# Patient Record
Sex: MALE | Race: WHITE | NOT HISPANIC OR LATINO | Employment: OTHER | URBAN - METROPOLITAN AREA
[De-identification: names, ages, dates, MRNs, and addresses within clinical notes are randomized per-mention and may not be internally consistent; named-entity substitution may affect disease eponyms.]

---

## 2023-04-10 ENCOUNTER — OFFICE VISIT (OUTPATIENT)
Dept: PRIMARY CARE | Facility: CLINIC | Age: 73
End: 2023-04-10
Payer: MEDICARE

## 2023-04-10 VITALS
DIASTOLIC BLOOD PRESSURE: 80 MMHG | WEIGHT: 210 LBS | SYSTOLIC BLOOD PRESSURE: 144 MMHG | BODY MASS INDEX: 29.29 KG/M2 | HEART RATE: 70 BPM

## 2023-04-10 DIAGNOSIS — M25.511 CHRONIC RIGHT SHOULDER PAIN: Primary | ICD-10-CM

## 2023-04-10 DIAGNOSIS — G89.29 CHRONIC RIGHT SHOULDER PAIN: Primary | ICD-10-CM

## 2023-04-10 PROBLEM — M51.06 LUMBAR DISC HERNIATION WITH MYELOPATHY: Status: ACTIVE | Noted: 2023-04-10

## 2023-04-10 PROBLEM — M19.041 LOCALIZED OSTEOARTHRITIS OF RIGHT HAND: Status: ACTIVE | Noted: 2023-04-10

## 2023-04-10 PROBLEM — E79.0 HYPERURICEMIA: Status: ACTIVE | Noted: 2023-04-10

## 2023-04-10 PROBLEM — N13.8 BPH WITH OBSTRUCTION/LOWER URINARY TRACT SYMPTOMS: Status: ACTIVE | Noted: 2023-04-10

## 2023-04-10 PROBLEM — S63.289A DISLOCATION OF FINGER PIP JOINT: Status: ACTIVE | Noted: 2023-04-10

## 2023-04-10 PROBLEM — M47.22 OSTEOARTHRITIS OF SPINE WITH RADICULOPATHY, CERVICAL REGION: Status: ACTIVE | Noted: 2023-04-10

## 2023-04-10 PROBLEM — M19.042 LOCALIZED OSTEOARTHRITIS OF LEFT HAND: Status: ACTIVE | Noted: 2023-04-10

## 2023-04-10 PROBLEM — M25.551 BILATERAL HIP PAIN: Status: ACTIVE | Noted: 2023-04-10

## 2023-04-10 PROBLEM — I25.10 CORONARY ARTERY CALCIFICATION SEEN ON CT SCAN: Status: ACTIVE | Noted: 2023-04-10

## 2023-04-10 PROBLEM — H81.10 BPPV (BENIGN PAROXYSMAL POSITIONAL VERTIGO): Status: ACTIVE | Noted: 2023-04-10

## 2023-04-10 PROBLEM — J43.9 EMPHYSEMA LUNG (MULTI): Status: ACTIVE | Noted: 2023-04-10

## 2023-04-10 PROBLEM — B35.1 ONYCHOMYCOSIS: Status: ACTIVE | Noted: 2023-04-10

## 2023-04-10 PROBLEM — I10 BENIGN ESSENTIAL HYPERTENSION: Status: ACTIVE | Noted: 2023-04-10

## 2023-04-10 PROBLEM — G25.0 ESSENTIAL TREMOR: Status: ACTIVE | Noted: 2023-04-10

## 2023-04-10 PROBLEM — R97.20 INCREASED PROSTATE SPECIFIC ANTIGEN (PSA) VELOCITY: Status: ACTIVE | Noted: 2023-04-10

## 2023-04-10 PROBLEM — Z81.2 FAMILY HISTORY OF TOBACCO ABUSE AND DEPENDENCE: Status: ACTIVE | Noted: 2023-04-10

## 2023-04-10 PROBLEM — M79.644 PAIN OF FINGER OF RIGHT HAND: Status: ACTIVE | Noted: 2023-04-10

## 2023-04-10 PROBLEM — K29.60 NSAID INDUCED GASTRITIS: Status: ACTIVE | Noted: 2023-04-10

## 2023-04-10 PROBLEM — Z78.9 SOCIAL DRINKER: Status: ACTIVE | Noted: 2023-04-10

## 2023-04-10 PROBLEM — D12.6 TUBULAR ADENOMA OF COLON: Status: ACTIVE | Noted: 2023-04-10

## 2023-04-10 PROBLEM — M62.830 LUMBAR PARASPINAL MUSCLE SPASM: Status: ACTIVE | Noted: 2023-04-10

## 2023-04-10 PROBLEM — M47.816 LOCALIZED OSTEOARTHRITIS OF LUMBAR SPINE: Status: ACTIVE | Noted: 2023-04-10

## 2023-04-10 PROBLEM — H61.899 DEBRIS IN EAR CANAL: Status: ACTIVE | Noted: 2023-04-10

## 2023-04-10 PROBLEM — M25.441: Status: ACTIVE | Noted: 2023-04-10

## 2023-04-10 PROBLEM — N40.1 BPH WITH OBSTRUCTION/LOWER URINARY TRACT SYMPTOMS: Status: ACTIVE | Noted: 2023-04-10

## 2023-04-10 PROBLEM — M25.552 BILATERAL HIP PAIN: Status: ACTIVE | Noted: 2023-04-10

## 2023-04-10 PROBLEM — E78.5 HYPERLIPIDEMIA: Status: ACTIVE | Noted: 2023-04-10

## 2023-04-10 PROBLEM — T39.395A NSAID INDUCED GASTRITIS: Status: ACTIVE | Noted: 2023-04-10

## 2023-04-10 PROBLEM — S63.259A CLOSED DISLOCATION OF FINGER: Status: ACTIVE | Noted: 2023-04-10

## 2023-04-10 PROBLEM — K92.2 GASTROINTESTINAL HEMORRHAGE: Status: ACTIVE | Noted: 2018-08-11

## 2023-04-10 PROBLEM — R91.8 PULMONARY NODULES: Status: ACTIVE | Noted: 2023-04-10

## 2023-04-10 PROCEDURE — 99213 OFFICE O/P EST LOW 20 MIN: CPT | Performed by: FAMILY MEDICINE

## 2023-04-10 PROCEDURE — 1160F RVW MEDS BY RX/DR IN RCRD: CPT | Performed by: FAMILY MEDICINE

## 2023-04-10 PROCEDURE — 20610 DRAIN/INJ JOINT/BURSA W/O US: CPT | Performed by: FAMILY MEDICINE

## 2023-04-10 PROCEDURE — 1159F MED LIST DOCD IN RCRD: CPT | Performed by: FAMILY MEDICINE

## 2023-04-10 PROCEDURE — 3077F SYST BP >= 140 MM HG: CPT | Performed by: FAMILY MEDICINE

## 2023-04-10 PROCEDURE — 3079F DIAST BP 80-89 MM HG: CPT | Performed by: FAMILY MEDICINE

## 2023-04-10 RX ORDER — MELOXICAM 7.5 MG/1
7.5 TABLET ORAL 2 TIMES DAILY
COMMUNITY
End: 2023-11-28 | Stop reason: WASHOUT

## 2023-04-10 RX ORDER — OLMESARTAN MEDOXOMIL 40 MG/1
40 TABLET ORAL DAILY
COMMUNITY
End: 2023-06-15 | Stop reason: SDUPTHER

## 2023-04-10 RX ORDER — ATORVASTATIN CALCIUM 40 MG/1
40 TABLET, FILM COATED ORAL DAILY
COMMUNITY
End: 2023-06-15 | Stop reason: SDUPTHER

## 2023-04-10 RX ORDER — TRIAMCINOLONE ACETONIDE 40 MG/ML
40 INJECTION, SUSPENSION INTRA-ARTICULAR; INTRAMUSCULAR
Status: COMPLETED | OUTPATIENT
Start: 2023-04-10 | End: 2023-04-10

## 2023-04-10 RX ORDER — GABAPENTIN 300 MG/1
300 CAPSULE ORAL NIGHTLY
COMMUNITY
Start: 2022-04-13 | End: 2023-11-28 | Stop reason: WASHOUT

## 2023-04-10 RX ORDER — TADALAFIL 2.5 MG/1
2.5 TABLET ORAL DAILY
COMMUNITY
End: 2023-06-15 | Stop reason: SDUPTHER

## 2023-04-10 RX ORDER — OMEPRAZOLE 20 MG/1
1 CAPSULE, DELAYED RELEASE ORAL
COMMUNITY
Start: 2022-02-09 | End: 2023-11-28 | Stop reason: WASHOUT

## 2023-04-10 RX ORDER — ALLOPURINOL 100 MG/1
100 TABLET ORAL DAILY
COMMUNITY
End: 2023-06-15 | Stop reason: SDUPTHER

## 2023-04-10 RX ADMIN — TRIAMCINOLONE ACETONIDE 40 MG: 40 INJECTION, SUSPENSION INTRA-ARTICULAR; INTRAMUSCULAR at 14:15

## 2023-04-10 ASSESSMENT — ENCOUNTER SYMPTOMS
DEPRESSION: 0
LOSS OF SENSATION IN FEET: 0
OCCASIONAL FEELINGS OF UNSTEADINESS: 0

## 2023-04-10 ASSESSMENT — PATIENT HEALTH QUESTIONNAIRE - PHQ9
1. LITTLE INTEREST OR PLEASURE IN DOING THINGS: NOT AT ALL
SUM OF ALL RESPONSES TO PHQ9 QUESTIONS 1 AND 2: 0
2. FEELING DOWN, DEPRESSED OR HOPELESS: NOT AT ALL

## 2023-04-10 NOTE — ASSESSMENT & PLAN NOTE
Right shoulder cortisone injection provided in office today.  We may continue to repeat these every 3+ months as necessary

## 2023-04-10 NOTE — PROGRESS NOTES
Subjective   Leonides Izaguirre is a 72 y.o. male who presents for cortisone injection (Right shoulder)    HPI  Presents today requesting repeat right shoulder cortisone injection  Has not had cortisone injection since March 2022, and it did not start bothering him until recently.  Pain started after lifting lots of heavy objects into the trunk of his car to move back up from [place].  Denies trauma.  Denies numbness or tingling to the fingers.    ROS: All pertinent positive symptoms are included in the history of present illness.    All other systems have been reviewed and are negative and noncontributory to this patient's current ailments.    Patient ID: Leonides Izaguirre is a 72 y.o. male.    Joint Injection Large/Arthrocentesis: R subacromial bursa on 4/10/2023 2:15 PM  Indications: pain  Details: 22 G needle, posterior approach  Medications: 40 mg triamcinolone acetonide 40 mg/mL (2 mL 1% lidocaine)  Outcome: tolerated well, no immediate complications  Procedure, treatment alternatives, risks and benefits explained, specific risks discussed. Consent was given by the patient.       Objective     Vitals:    04/10/23 1359   BP: 144/80   Pulse: 70   Weight: 95.3 kg (210 lb)       Physical Exam    CONSTITUTIONAL - well nourished, well developed, looks like stated age, in no acute distress, not ill-appearing, and not tired appearing  SKIN - No lesions or rashes visualized.   HEAD - Atraumatic, normocephalic.  EYES - EOMI with normal external exam  RESP - respiration not labored   CARDIAC - extremities warm, well-perfused  PSYCHIATRIC - alert, oriented to time, place, person and no difficulty with speech or language  MUSCULOSKELETAL -right shoulder flexion and abduction limited secondary to pain.  Mild tenderness to palpation over the supraspinatus    Assessment/Plan   Problem List Items Addressed This Visit          Musculoskeletal    Right shoulder pain - Primary     Right shoulder cortisone injection provided in office  today.  We may continue to repeat these every 3+ months as necessary

## 2023-06-06 ENCOUNTER — LAB (OUTPATIENT)
Dept: LAB | Facility: LAB | Age: 73
End: 2023-06-06
Payer: MEDICARE

## 2023-06-06 DIAGNOSIS — E78.2 MIXED HYPERLIPIDEMIA: ICD-10-CM

## 2023-06-06 DIAGNOSIS — I10 BENIGN ESSENTIAL HYPERTENSION: ICD-10-CM

## 2023-06-06 DIAGNOSIS — E79.0 HYPERURICEMIA: Primary | ICD-10-CM

## 2023-06-06 DIAGNOSIS — E79.0 HYPERURICEMIA: ICD-10-CM

## 2023-06-06 LAB
ALANINE AMINOTRANSFERASE (SGPT) (U/L) IN SER/PLAS: 19 U/L (ref 10–52)
ALBUMIN (G/DL) IN SER/PLAS: 4.4 G/DL (ref 3.4–5)
ALKALINE PHOSPHATASE (U/L) IN SER/PLAS: 79 U/L (ref 33–136)
ANION GAP IN SER/PLAS: 12 MMOL/L (ref 10–20)
ASPARTATE AMINOTRANSFERASE (SGOT) (U/L) IN SER/PLAS: 18 U/L (ref 9–39)
BILIRUBIN TOTAL (MG/DL) IN SER/PLAS: 0.7 MG/DL (ref 0–1.2)
CALCIUM (MG/DL) IN SER/PLAS: 9.9 MG/DL (ref 8.6–10.3)
CARBON DIOXIDE, TOTAL (MMOL/L) IN SER/PLAS: 30 MMOL/L (ref 21–32)
CHLORIDE (MMOL/L) IN SER/PLAS: 104 MMOL/L (ref 98–107)
CHOLESTEROL (MG/DL) IN SER/PLAS: 147 MG/DL (ref 0–199)
CHOLESTEROL IN HDL (MG/DL) IN SER/PLAS: 55.4 MG/DL
CHOLESTEROL/HDL RATIO: 2.7
CREATININE (MG/DL) IN SER/PLAS: 1.15 MG/DL (ref 0.5–1.3)
GFR MALE: 67 ML/MIN/1.73M2
GLUCOSE (MG/DL) IN SER/PLAS: 95 MG/DL (ref 74–99)
LDL: 60 MG/DL (ref 0–99)
POTASSIUM (MMOL/L) IN SER/PLAS: 5.2 MMOL/L (ref 3.5–5.3)
PROTEIN TOTAL: 7 G/DL (ref 6.4–8.2)
SODIUM (MMOL/L) IN SER/PLAS: 141 MMOL/L (ref 136–145)
TRIGLYCERIDE (MG/DL) IN SER/PLAS: 156 MG/DL (ref 0–149)
URATE (MG/DL) IN SER/PLAS: 6.8 MG/DL (ref 4–7.5)
UREA NITROGEN (MG/DL) IN SER/PLAS: 14 MG/DL (ref 6–23)
VLDL: 31 MG/DL (ref 0–40)

## 2023-06-06 PROCEDURE — 84550 ASSAY OF BLOOD/URIC ACID: CPT

## 2023-06-06 PROCEDURE — 80061 LIPID PANEL: CPT

## 2023-06-06 PROCEDURE — 80053 COMPREHEN METABOLIC PANEL: CPT

## 2023-06-06 PROCEDURE — 36415 COLL VENOUS BLD VENIPUNCTURE: CPT

## 2023-06-07 NOTE — RESULT ENCOUNTER NOTE
We will review in the office on Kalie 15    Cholesterol 147, 55, 60, 156 which is a very nice panel    Sugar, kidney, liver, electrolytes normal    Uric acid 6.8, so we could do better because the goal is less than 6.0.  If you are not opposed to it, I would recommend increasing the allopurinol to 300 mg daily to get that even lower.  Uric acid crystals starts to resolve when we get that number below about 4.0

## 2023-06-15 ENCOUNTER — OFFICE VISIT (OUTPATIENT)
Dept: PRIMARY CARE | Facility: CLINIC | Age: 73
End: 2023-06-15
Payer: MEDICARE

## 2023-06-15 ENCOUNTER — LAB (OUTPATIENT)
Dept: LAB | Facility: LAB | Age: 73
End: 2023-06-15
Payer: MEDICARE

## 2023-06-15 VITALS
SYSTOLIC BLOOD PRESSURE: 140 MMHG | DIASTOLIC BLOOD PRESSURE: 80 MMHG | BODY MASS INDEX: 29.29 KG/M2 | HEART RATE: 70 BPM | WEIGHT: 210 LBS

## 2023-06-15 DIAGNOSIS — H61.899 DEBRIS IN EAR CANAL: ICD-10-CM

## 2023-06-15 DIAGNOSIS — I10 BENIGN ESSENTIAL HYPERTENSION: ICD-10-CM

## 2023-06-15 DIAGNOSIS — N40.1 BPH WITH OBSTRUCTION/LOWER URINARY TRACT SYMPTOMS: ICD-10-CM

## 2023-06-15 DIAGNOSIS — J43.1 PANLOBULAR EMPHYSEMA (MULTI): ICD-10-CM

## 2023-06-15 DIAGNOSIS — E79.0 HYPERURICEMIA: ICD-10-CM

## 2023-06-15 DIAGNOSIS — N13.8 BPH WITH OBSTRUCTION/LOWER URINARY TRACT SYMPTOMS: ICD-10-CM

## 2023-06-15 DIAGNOSIS — Z00.00 ENCOUNTER FOR ANNUAL WELLNESS EXAM IN MEDICARE PATIENT: Primary | ICD-10-CM

## 2023-06-15 DIAGNOSIS — R97.20 ELEVATED PSA: ICD-10-CM

## 2023-06-15 DIAGNOSIS — E78.2 MIXED HYPERLIPIDEMIA: ICD-10-CM

## 2023-06-15 PROBLEM — M25.511 RIGHT SHOULDER PAIN: Status: RESOLVED | Noted: 2023-04-10 | Resolved: 2023-06-15

## 2023-06-15 PROBLEM — Z81.2 FAMILY HISTORY OF TOBACCO ABUSE AND DEPENDENCE: Status: RESOLVED | Noted: 2023-04-10 | Resolved: 2023-06-15

## 2023-06-15 PROBLEM — H81.10 BPPV (BENIGN PAROXYSMAL POSITIONAL VERTIGO): Status: RESOLVED | Noted: 2023-04-10 | Resolved: 2023-06-15

## 2023-06-15 PROBLEM — K29.60 NSAID INDUCED GASTRITIS: Status: RESOLVED | Noted: 2023-04-10 | Resolved: 2023-06-15

## 2023-06-15 PROBLEM — T39.395A NSAID INDUCED GASTRITIS: Status: RESOLVED | Noted: 2023-04-10 | Resolved: 2023-06-15

## 2023-06-15 PROBLEM — M79.644 PAIN OF FINGER OF RIGHT HAND: Status: RESOLVED | Noted: 2023-04-10 | Resolved: 2023-06-15

## 2023-06-15 PROBLEM — M25.441: Status: RESOLVED | Noted: 2023-04-10 | Resolved: 2023-06-15

## 2023-06-15 PROBLEM — S63.289A DISLOCATION OF FINGER PIP JOINT: Status: RESOLVED | Noted: 2023-04-10 | Resolved: 2023-06-15

## 2023-06-15 PROBLEM — M62.830 LUMBAR PARASPINAL MUSCLE SPASM: Status: RESOLVED | Noted: 2023-04-10 | Resolved: 2023-06-15

## 2023-06-15 PROBLEM — S63.259A CLOSED DISLOCATION OF FINGER: Status: RESOLVED | Noted: 2023-04-10 | Resolved: 2023-06-15

## 2023-06-15 PROBLEM — K92.2 GASTROINTESTINAL HEMORRHAGE: Status: RESOLVED | Noted: 2018-08-11 | Resolved: 2023-06-15

## 2023-06-15 LAB — PROSTATE SPECIFIC AG (NG/ML) IN SER/PLAS: 1.69 NG/ML (ref 0–4)

## 2023-06-15 PROCEDURE — 36415 COLL VENOUS BLD VENIPUNCTURE: CPT

## 2023-06-15 PROCEDURE — 84153 ASSAY OF PSA TOTAL: CPT

## 2023-06-15 PROCEDURE — 1160F RVW MEDS BY RX/DR IN RCRD: CPT | Performed by: FAMILY MEDICINE

## 2023-06-15 PROCEDURE — 1170F FXNL STATUS ASSESSED: CPT | Performed by: FAMILY MEDICINE

## 2023-06-15 PROCEDURE — 99214 OFFICE O/P EST MOD 30 MIN: CPT | Performed by: FAMILY MEDICINE

## 2023-06-15 PROCEDURE — 3077F SYST BP >= 140 MM HG: CPT | Performed by: FAMILY MEDICINE

## 2023-06-15 PROCEDURE — 1159F MED LIST DOCD IN RCRD: CPT | Performed by: FAMILY MEDICINE

## 2023-06-15 PROCEDURE — G0439 PPPS, SUBSEQ VISIT: HCPCS | Performed by: FAMILY MEDICINE

## 2023-06-15 PROCEDURE — 3079F DIAST BP 80-89 MM HG: CPT | Performed by: FAMILY MEDICINE

## 2023-06-15 RX ORDER — TADALAFIL 2.5 MG/1
2.5 TABLET ORAL DAILY
Qty: 90 TABLET | Refills: 1 | Status: SHIPPED | OUTPATIENT
Start: 2023-06-15 | End: 2023-11-28 | Stop reason: SDUPTHER

## 2023-06-15 RX ORDER — ALLOPURINOL 100 MG/1
100 TABLET ORAL DAILY
Qty: 90 TABLET | Refills: 1 | Status: SHIPPED | OUTPATIENT
Start: 2023-06-15 | End: 2023-11-28 | Stop reason: SDUPTHER

## 2023-06-15 RX ORDER — ATORVASTATIN CALCIUM 40 MG/1
40 TABLET, FILM COATED ORAL DAILY
Qty: 90 TABLET | Refills: 1 | Status: SHIPPED | OUTPATIENT
Start: 2023-06-15 | End: 2023-11-28 | Stop reason: SDUPTHER

## 2023-06-15 RX ORDER — FLUOCINOLONE ACETONIDE 0.11 MG/ML
5 OIL AURICULAR (OTIC) 2 TIMES DAILY
Qty: 20 ML | Refills: 0 | Status: SHIPPED | OUTPATIENT
Start: 2023-06-15

## 2023-06-15 RX ORDER — OLMESARTAN MEDOXOMIL 40 MG/1
40 TABLET ORAL DAILY
Qty: 90 TABLET | Refills: 1 | Status: SHIPPED | OUTPATIENT
Start: 2023-06-15 | End: 2023-11-28 | Stop reason: SDUPTHER

## 2023-06-15 ASSESSMENT — ENCOUNTER SYMPTOMS
DEPRESSION: 0
LOSS OF SENSATION IN FEET: 0
OCCASIONAL FEELINGS OF UNSTEADINESS: 0

## 2023-06-15 ASSESSMENT — ACTIVITIES OF DAILY LIVING (ADL)
DRESSING: INDEPENDENT
BATHING: INDEPENDENT
TAKING_MEDICATION: INDEPENDENT
MANAGING_FINANCES: INDEPENDENT
DOING_HOUSEWORK: INDEPENDENT
GROCERY_SHOPPING: INDEPENDENT

## 2023-06-15 ASSESSMENT — PATIENT HEALTH QUESTIONNAIRE - PHQ9
SUM OF ALL RESPONSES TO PHQ9 QUESTIONS 1 AND 2: 0
2. FEELING DOWN, DEPRESSED OR HOPELESS: NOT AT ALL
1. LITTLE INTEREST OR PLEASURE IN DOING THINGS: NOT AT ALL

## 2023-06-15 NOTE — PROGRESS NOTES
Subjective   Reason for Visit: Leonides Izaguirre is an 72 y.o. male here for a Medicare Annual Wellness Visit Subsequent, Hypertension, Hyperlipidemia, Hyperuricemia, and Benign Prostatic Hypertrophy.     Past Medical, Surgical, and Family History reviewed and updated in chart.    Reviewed all medications by prescribing practitioner or clinical pharmacist (such as prescriptions, OTCs, herbal therapies and supplements) and documented in the medical record.    HPI  1.  Medicare wellness  Immunizations: Up to date  Colon Cancer Screening: Last performed in Sept. 2022, Multiple polyps, clearance for 2 years.  Prostate Cancer Screening: PSA increased from June 2022 was 1.67 to 2.20 in November 2022. Needs to be rechecked.  Tobacco: Continues to use tobacco daily, desires to discuss AccuLaser.  Last CT performed in Nov. 2022.  EtOH: Occasional use.    2.  Hypertension  Blood pressure today on intake was 140/80, with recheck of 128/80.  Currently taking olmesartan 40 mg daily.  Tolerating the medication well.  Denies chest pain, shortness of breath, headache, dizziness, or leg edema.     3.  Hyperlipidemia  Currently taking atorvastatin 40 mg daily.  Lipid panel performed in June 2023 showed his cholesterol is 147, 55.4, 60, 156.  Triglycerides improved from 243 back in Nov. 2022.  Denies myalgias.     4.  Hyperuricemia  Using allopurinol 100 mg daily.  Last uric acid performed in June 2023, showing 6.8  Has not had any gout flares.  Does not desire to change medications dose.     5.  BPH with LUTS  Currently taking tadalafil 2.5 mg daily.  Occasionally he will forget to take this medication.  Symptoms have been well controlled when he does take his medication.  Desires refills.     6.  Dilatation of ascending aorta  Low-dose CT scan performed of the lung in November 2022 indicating that his ascending aorta is 4.1 cm and is stable with prior exams. Denies shortness of breath or chest pain.   Due for repeated low dose CT in  Nov. 2023.    7.  Debris in ear canals.  Occasionally needs fluocinolone to help.  Desires to have refills.    Review of Systems  All pertinent positive symptoms are included in the history of present illness.    All other systems have been reviewed and are negative and noncontributory to this patient's current ailments.    Current Outpatient Medications   Medication Instructions    allopurinol (ZYLOPRIM) 100 mg, oral, Daily    atorvastatin (LIPITOR) 40 mg, oral, Daily    fluocinolone (DermOtic) 0.01 % ear drops 5 drops, Each Ear, 2 times daily    gabapentin (NEURONTIN) 300 mg, oral, Nightly    meloxicam (MOBIC) 7.5 mg, oral, 2 times daily    olmesartan (BENICAR) 40 mg, oral, Daily    omeprazole (PriLOSEC) 20 mg DR capsule 1 capsule, oral, 2 times daily before meals    tadalafil (CIALIS) 2.5 mg, oral, Daily     Allergies   Allergen Reactions    Amlodipine Swelling     Immunization History   Administered Date(s) Administered    Influenza, High-dose Seasonal, Quadrivalent, Preservative Free 09/18/2020    Influenza, Seasonal, Quadrivalent, Adjuvanted 10/19/2021    Influenza, injectable, quadrivalent 11/27/2019    Moderna SARS-CoV-2 Vaccination 02/04/2021, 03/04/2021    Pfizer Gray Cap SARS-CoV-2 05/20/2022    Pfizer Purple Cap SARS-CoV-2 11/16/2021, 10/12/2022    Pfizer Sars-cov-2 Bivalent 30 mcg/0.3 mL 11/15/2022    Pneumococcal Conjugate PCV 13 11/29/2017    Pneumococcal Polysaccharide PPSV23 05/21/2013, 02/12/2019    Tdap 11/29/2017    Zoster, Recombinant 07/28/2019    Zoster, Unspecified 10/23/2019    Zoster, live 05/21/2013     Past Surgical History:   Procedure Laterality Date    OTHER SURGICAL HISTORY  12/12/2013    Prior Surgical Procedure Not Done     Family History   Problem Relation Name Age of Onset    Esophageal cancer Mother      Colon cancer Mother      Esophageal cancer Maternal Grandmother         Social History     Tobacco Use    Smoking status: Every Day     Types: Cigarettes    Smokeless tobacco:  Never     Patient Self Assessment of Health Status  Patient Self Assessment: Good    Nutrition and Exercise  Current Diet: Well Balanced Diet  Adequate Fluid Intake: Yes  Caffeine: Yes  Exercise Frequency: Infrequently    Functional Ability/Level of Safety  Cognitive Impairment Observed: No cognitive impairment observed  Cognitive Impairment Reported: No cognitive impairment reported by patient or family    Home Safety Risk Factors: None    Objective   Visit Vitals  /80   Pulse 70   Wt 95.3 kg (210 lb)   BMI 29.29 kg/m²   Smoking Status Every Day   BSA 2.18 m²      Physical Exam  CONSTITUTIONAL - INAD. Not ill appearing.  SKIN - No lesions or rashes visualized. Good skin turgor.  HEAD - Atraumatic, normocephalic..  RESP - CTAB. No wheezing, rhonchi, or crackles.   CARDIAC - RRR. No murmurs, gallops, or rubs.    Assessment/Plan   Problem List Items Addressed This Visit       Benign essential hypertension    Current Assessment & Plan     Stable, no changes to medication recommended         Relevant Medications    olmesartan (BENIcar) 40 mg tablet    BPH with obstruction/lower urinary tract symptoms    Current Assessment & Plan     Stable, no changes to medication recommended         Relevant Medications    tadalafil (Cialis) 2.5 mg tablet    Emphysema lung (CMS/HCC)    Current Assessment & Plan     You have been seen by pulmonology in the past, please follow-up if symptoms worsen         Hyperlipidemia    Current Assessment & Plan     Lab work looks stable.  Continue atorvastatin 40 mg daily.         Relevant Medications    atorvastatin (Lipitor) 40 mg tablet    Hyperuricemia    Current Assessment & Plan     Uric acid is 6.8  Continue allopurinol 100 mg daily         Relevant Medications    allopurinol (Zyloprim) 100 mg tablet    Debris in ear canal    Current Assessment & Plan     Refills provided for fluocinolone.         Relevant Medications    fluocinolone (DermOtic) 0.01 % ear drops    Elevated PSA     Current Assessment & Plan     We will notify you of the results and make recommendations accordingly.   If this continues elevated, we will need to consider a urology referral.         Relevant Orders    PSA     Other Visit Diagnoses       Encounter for annual wellness exam in Medicare patient    -  Primary    Questions were reviewed  Immunizations are up-to-date          Patient Care Team:  Michael Huddleston DO as PCP - General  Michael Huddleston DO as PCP - [facility] Attributed Provider

## 2023-06-15 NOTE — ASSESSMENT & PLAN NOTE
We will notify you of the results and make recommendations accordingly.   If this continues elevated, we will need to consider a urology referral.

## 2023-11-21 ENCOUNTER — LAB (OUTPATIENT)
Dept: LAB | Facility: LAB | Age: 73
End: 2023-11-21
Payer: MEDICARE

## 2023-11-21 DIAGNOSIS — I10 BENIGN ESSENTIAL HYPERTENSION: Primary | ICD-10-CM

## 2023-11-21 DIAGNOSIS — I10 BENIGN ESSENTIAL HYPERTENSION: ICD-10-CM

## 2023-11-21 DIAGNOSIS — R97.20 ELEVATED PSA: ICD-10-CM

## 2023-11-21 DIAGNOSIS — E78.2 MIXED HYPERLIPIDEMIA: ICD-10-CM

## 2023-11-21 LAB
ALBUMIN SERPL BCP-MCNC: 4.5 G/DL (ref 3.4–5)
ALP SERPL-CCNC: 75 U/L (ref 33–136)
ALT SERPL W P-5'-P-CCNC: 24 U/L (ref 10–52)
ANION GAP SERPL CALC-SCNC: 11 MMOL/L (ref 10–20)
AST SERPL W P-5'-P-CCNC: 21 U/L (ref 9–39)
BILIRUB SERPL-MCNC: 0.7 MG/DL (ref 0–1.2)
BUN SERPL-MCNC: 17 MG/DL (ref 6–23)
CALCIUM SERPL-MCNC: 9.5 MG/DL (ref 8.6–10.3)
CHLORIDE SERPL-SCNC: 104 MMOL/L (ref 98–107)
CHOLEST SERPL-MCNC: 142 MG/DL (ref 0–199)
CHOLESTEROL/HDL RATIO: 3
CO2 SERPL-SCNC: 29 MMOL/L (ref 21–32)
CREAT SERPL-MCNC: 1.13 MG/DL (ref 0.5–1.3)
GFR SERPL CREATININE-BSD FRML MDRD: 68 ML/MIN/1.73M*2
GLUCOSE SERPL-MCNC: 95 MG/DL (ref 74–99)
HDLC SERPL-MCNC: 46.7 MG/DL
LDLC SERPL CALC-MCNC: 54 MG/DL
NON HDL CHOLESTEROL: 95 MG/DL (ref 0–149)
POTASSIUM SERPL-SCNC: 4.6 MMOL/L (ref 3.5–5.3)
PROT SERPL-MCNC: 7.1 G/DL (ref 6.4–8.2)
PSA SERPL-MCNC: 1.6 NG/ML
SODIUM SERPL-SCNC: 139 MMOL/L (ref 136–145)
TRIGL SERPL-MCNC: 205 MG/DL (ref 0–149)
VLDL: 41 MG/DL (ref 0–40)

## 2023-11-21 PROCEDURE — 80061 LIPID PANEL: CPT

## 2023-11-21 PROCEDURE — 84153 ASSAY OF PSA TOTAL: CPT

## 2023-11-21 PROCEDURE — 80053 COMPREHEN METABOLIC PANEL: CPT

## 2023-11-21 PROCEDURE — 36415 COLL VENOUS BLD VENIPUNCTURE: CPT

## 2023-11-21 NOTE — RESULT ENCOUNTER NOTE
We will review in the office on November 28 together:  Cholesterol 142, 46, 54, 205 previously 147, 55, 60, 156  Prostate cancer screening test is stable with a PSA of 1.60 previous 1.69  Sugar, kidney, liver, electrolytes normal

## 2023-11-28 ENCOUNTER — OFFICE VISIT (OUTPATIENT)
Dept: PRIMARY CARE | Facility: CLINIC | Age: 73
End: 2023-11-28
Payer: MEDICARE

## 2023-11-28 VITALS
WEIGHT: 214 LBS | BODY MASS INDEX: 29.85 KG/M2 | HEART RATE: 72 BPM | SYSTOLIC BLOOD PRESSURE: 132 MMHG | DIASTOLIC BLOOD PRESSURE: 70 MMHG

## 2023-11-28 DIAGNOSIS — E79.0 HYPERURICEMIA: ICD-10-CM

## 2023-11-28 DIAGNOSIS — I10 BENIGN ESSENTIAL HYPERTENSION: Primary | ICD-10-CM

## 2023-11-28 DIAGNOSIS — E78.2 MIXED HYPERLIPIDEMIA: ICD-10-CM

## 2023-11-28 DIAGNOSIS — N40.1 BPH WITH OBSTRUCTION/LOWER URINARY TRACT SYMPTOMS: ICD-10-CM

## 2023-11-28 DIAGNOSIS — N13.8 BPH WITH OBSTRUCTION/LOWER URINARY TRACT SYMPTOMS: ICD-10-CM

## 2023-11-28 PROBLEM — R97.20 ELEVATED PSA: Status: RESOLVED | Noted: 2023-06-15 | Resolved: 2023-11-28

## 2023-11-28 PROBLEM — R97.20 INCREASED PROSTATE SPECIFIC ANTIGEN (PSA) VELOCITY: Status: RESOLVED | Noted: 2023-04-10 | Resolved: 2023-11-28

## 2023-11-28 PROCEDURE — 1159F MED LIST DOCD IN RCRD: CPT | Performed by: FAMILY MEDICINE

## 2023-11-28 PROCEDURE — 4004F PT TOBACCO SCREEN RCVD TLK: CPT | Performed by: FAMILY MEDICINE

## 2023-11-28 PROCEDURE — 3078F DIAST BP <80 MM HG: CPT | Performed by: FAMILY MEDICINE

## 2023-11-28 PROCEDURE — 99214 OFFICE O/P EST MOD 30 MIN: CPT | Performed by: FAMILY MEDICINE

## 2023-11-28 PROCEDURE — 1160F RVW MEDS BY RX/DR IN RCRD: CPT | Performed by: FAMILY MEDICINE

## 2023-11-28 PROCEDURE — 3075F SYST BP GE 130 - 139MM HG: CPT | Performed by: FAMILY MEDICINE

## 2023-11-28 RX ORDER — OLMESARTAN MEDOXOMIL 40 MG/1
40 TABLET ORAL DAILY
Qty: 90 TABLET | Refills: 1 | Status: SHIPPED | OUTPATIENT
Start: 2023-11-28 | End: 2024-05-16 | Stop reason: SDUPTHER

## 2023-11-28 RX ORDER — ALLOPURINOL 100 MG/1
100 TABLET ORAL DAILY
Qty: 90 TABLET | Refills: 1 | Status: SHIPPED | OUTPATIENT
Start: 2023-11-28 | End: 2024-05-16 | Stop reason: SDUPTHER

## 2023-11-28 RX ORDER — TADALAFIL 5 MG/1
5 TABLET ORAL DAILY
Qty: 90 TABLET | Refills: 1 | Status: SHIPPED | OUTPATIENT
Start: 2023-11-28 | End: 2024-05-16 | Stop reason: SDUPTHER

## 2023-11-28 RX ORDER — ATORVASTATIN CALCIUM 40 MG/1
40 TABLET, FILM COATED ORAL DAILY
Qty: 90 TABLET | Refills: 1 | Status: SHIPPED | OUTPATIENT
Start: 2023-11-28 | End: 2024-05-16 | Stop reason: SDUPTHER

## 2023-11-28 ASSESSMENT — PATIENT HEALTH QUESTIONNAIRE - PHQ9
2. FEELING DOWN, DEPRESSED OR HOPELESS: NOT AT ALL
SUM OF ALL RESPONSES TO PHQ9 QUESTIONS 1 AND 2: 0
1. LITTLE INTEREST OR PLEASURE IN DOING THINGS: NOT AT ALL

## 2023-11-28 NOTE — ASSESSMENT & PLAN NOTE
We will continue to monitor uric acid level, last done in June  Continue allopurinol without change

## 2023-11-28 NOTE — PROGRESS NOTES
Subjective   Patient ID: Leonides Izaguirre is a 72 y.o. male who presents for Hyperlipidemia, Erectile Dysfunction, and Hypertension.    Past Medical, Surgical, and Family History reviewed and updated in chart.    Reviewed all medications by prescribing practitioner or clinical pharmacist (such as prescriptions, OTCs, herbal therapies and supplements) and documented in the medical record.    HPI  1.  BPH.  Stable, currently taking Cialis 2.5 mg daily  Would be interested in increasing the dose to 5 mg daily to see if there is a bit more of a benefit  Prostate cancer screening test is stable with a PSA of 1.60 previous 1.69    2.  Hypertension.  BP looks excellent on intake, no reported chest pain or shortness of breath  Currently taking medication noted on the chart, requesting refill    3.  Hyperuricemia.  Currently taking allopurinol, last uric acid level was in June, noted to be 6.8    4.  Hyperlipidemia.  Cholesterol 142, 46, 54, 205 previously 147, 55, 60, 156  Tolerates medication well, requesting refill    Review of Systems  All pertinent positive symptoms are included in the history of present illness.    All other systems have been reviewed and are negative and noncontributory to this patient's current ailments.    Past Medical History:   Diagnosis Date    Encounter for screening for cardiovascular disorders 06/11/2021    Screening for AAA (abdominal aortic aneurysm)    Essential (primary) hypertension 12/01/2022    Benign essential hypertension    Hyperlipidemia, unspecified 12/01/2022    Hyperlipidemia    Nicotine dependence, unspecified, uncomplicated 05/23/2022    Tobacco dependence     Past Surgical History:   Procedure Laterality Date    OTHER SURGICAL HISTORY  12/12/2013    Prior Surgical Procedure Not Done     Social History     Tobacco Use    Smoking status: Every Day     Types: Cigarettes    Smokeless tobacco: Never     Family History   Problem Relation Name Age of Onset    Esophageal cancer Mother       Colon cancer Mother      Esophageal cancer Maternal Grandmother       Immunization History   Administered Date(s) Administered    Flu vaccine, quadrivalent, high-dose, preservative free, age 65y+ (FLUZONE) 09/18/2020    Influenza, High Dose Seasonal, Preservative Free 11/16/2019, 09/19/2020, 10/12/2022    Influenza, Seasonal, Quadrivalent, Adjuvanted 10/19/2021    Influenza, injectable, quadrivalent 11/27/2019    Moderna SARS-CoV-2 Vaccination 02/04/2021, 03/04/2021    Pfizer COVID-19 vaccine, bivalent, age 12 years and older (30 mcg/0.3 mL) 11/15/2022    Pfizer Gray Cap SARS-CoV-2 05/20/2022    Pfizer Purple Cap SARS-CoV-2 11/16/2021, 10/12/2022    Pneumococcal conjugate vaccine, 13-valent (PREVNAR 13) 11/29/2017    Pneumococcal polysaccharide vaccine, 23-valent, age 2 years and older (PNEUMOVAX 23) 05/21/2013, 02/12/2019    Tdap vaccine, age 7 year and older (BOOSTRIX) 11/29/2017    Zoster vaccine, recombinant, adult (SHINGRIX) 07/28/2019    Zoster, Unspecified 10/23/2019    Zoster, live 05/21/2013     Current Outpatient Medications   Medication Instructions    allopurinol (ZYLOPRIM) 100 mg, oral, Daily    atorvastatin (LIPITOR) 40 mg, oral, Daily    fluocinolone (DermOtic) 0.01 % ear drops 5 drops, Each Ear, 2 times daily    olmesartan (BENICAR) 40 mg, oral, Daily    tadalafil (CIALIS) 5 mg, oral, Daily     Allergies   Allergen Reactions    Amlodipine Swelling       Objective   Visit Vitals  /70   Pulse 72   Wt 97.1 kg (214 lb)   BMI 29.85 kg/m²   Smoking Status Every Day   BSA 2.21 m²     Lab on 11/21/2023   Component Date Value    Cholesterol 11/21/2023 142     HDL-Cholesterol 11/21/2023 46.7     Cholesterol/HDL Ratio 11/21/2023 3.0     LDL Calculated 11/21/2023 54     VLDL 11/21/2023 41 (H)     Triglycerides 11/21/2023 205 (H)     Non HDL Cholesterol 11/21/2023 95     Glucose 11/21/2023 95     Sodium 11/21/2023 139     Potassium 11/21/2023 4.6     Chloride 11/21/2023 104     Bicarbonate 11/21/2023 29      Anion Gap 11/21/2023 11     Urea Nitrogen 11/21/2023 17     Creatinine 11/21/2023 1.13     eGFR 11/21/2023 68     Calcium 11/21/2023 9.5     Albumin 11/21/2023 4.5     Alkaline Phosphatase 11/21/2023 75     Total Protein 11/21/2023 7.1     AST 11/21/2023 21     Bilirubin, Total 11/21/2023 0.7     ALT 11/21/2023 24     Prostate Specific AG 11/21/2023 1.60      Physical Exam  CONSTITUTIONAL - well nourished, well developed, looks like stated age, in no acute distress, not ill-appearing, and not tired appearing  SKIN - normal skin color and pigmentation, normal skin turgor without rash, lesions, or nodules visualized  HEAD - no trauma, normocephalic  EYES - pupils are equal and reactive to light, extraocular muscles are intact, and normal external exam  CHEST - clear to auscultation, no wheezing, no crackles and no rales, good effort  CARDIAC - regular rate and regular rhythm, no skipped beats, no murmur  EXTREMITIES - no obvious or evident edema, no obvious or evident deformities  NEUROLOGICAL - normal gait, normal balance, normal motor, no ataxia, alert, oriented and no focal signs  PSYCHIATRIC - alert, pleasant and cordial, age-appropriate    Assessment/Plan   Problem List Items Addressed This Visit       Benign essential hypertension - Primary     Stable, no changes to medication recommended  I would like to have you monitor and record blood pressures at home   Blood pressure goal should be below 130/80, ideally 120/80  If the blood pressure is too high or too low, we need to consider making adjustments to your antihypertensive therapy         Relevant Medications    olmesartan (BENIcar) 40 mg tablet    BPH with obstruction/lower urinary tract symptoms     Increase Cialis from 2.5 mg daily to 5 mg daily         Relevant Medications    tadalafil (Cialis) 5 mg tablet    Hyperlipidemia     Stable, no changes to medication recommended         Relevant Medications    atorvastatin (Lipitor) 40 mg tablet     Hyperuricemia     We will continue to monitor uric acid level, last done in June  Continue allopurinol without change         Relevant Medications    allopurinol (Zyloprim) 100 mg tablet

## 2023-12-05 ENCOUNTER — ANCILLARY PROCEDURE (OUTPATIENT)
Dept: RADIOLOGY | Facility: CLINIC | Age: 73
End: 2023-12-05
Payer: MEDICARE

## 2023-12-05 DIAGNOSIS — I35.9 AORTIC VALVE CALCIFICATION: Primary | ICD-10-CM

## 2023-12-05 DIAGNOSIS — F17.210 NICOTINE DEPENDENCE, CIGARETTES, UNCOMPLICATED: ICD-10-CM

## 2023-12-05 DIAGNOSIS — I35.8 AORTIC VALVE SCLEROSIS: ICD-10-CM

## 2023-12-05 PROCEDURE — 71271 CT THORAX LUNG CANCER SCR C-: CPT | Performed by: RADIOLOGY

## 2023-12-05 PROCEDURE — 71271 CT THORAX LUNG CANCER SCR C-: CPT

## 2023-12-06 NOTE — RESULT ENCOUNTER NOTE
CT chest continues to reveal stable pulmonary nodules, and radiology continues to suggest annual screening    They also talk about what appears to be a calcified aortic valve and they are recommending an echocardiogram, which has not been done since 2019 so since they recommended, I usually go along with the recommendations.  If you are interested in pursuing I can certainly make that happen through cardiology

## 2023-12-22 ENCOUNTER — HOSPITAL ENCOUNTER (OUTPATIENT)
Dept: CARDIOLOGY | Facility: CLINIC | Age: 73
Discharge: HOME | End: 2023-12-22
Payer: MEDICARE

## 2023-12-22 DIAGNOSIS — I35.8 AORTIC VALVE SCLEROSIS: ICD-10-CM

## 2023-12-22 DIAGNOSIS — I35.9 AORTIC VALVE CALCIFICATION: ICD-10-CM

## 2023-12-22 LAB
AORTIC VALVE PEAK VELOCITY: 1.33
AV PEAK GRADIENT: 7.1
AVA (PEAK VEL): 3.34
EJECTION FRACTION APICAL 4 CHAMBER: 61.6
EJECTION FRACTION: 67
LEFT ATRIUM VOLUME AREA LENGTH INDEX BSA: 18.1
LEFT VENTRICLE INTERNAL DIMENSION DIASTOLE: 4.1 (ref 3.5–6)
LEFT VENTRICULAR OUTFLOW TRACT DIAMETER: 2.13
MITRAL VALVE E/A RATIO: 1.05
MITRAL VALVE E/E' RATIO: 16.43
RIGHT VENTRICLE FREE WALL PEAK S': 14
TRICUSPID ANNULAR PLANE SYSTOLIC EXCURSION: 1.8

## 2023-12-22 PROCEDURE — 93306 TTE W/DOPPLER COMPLETE: CPT

## 2023-12-22 PROCEDURE — 93306 TTE W/DOPPLER COMPLETE: CPT | Performed by: INTERNAL MEDICINE

## 2023-12-24 DIAGNOSIS — I25.3 ATRIAL SEPTAL ANEURYSM: Primary | ICD-10-CM

## 2023-12-24 DIAGNOSIS — I77.810 ASCENDING AORTA DILATATION (CMS-HCC): ICD-10-CM

## 2023-12-24 NOTE — RESULT ENCOUNTER NOTE
Bill, the results of your echocardiogram have come in and it appears that your left ventricle is functioning as it should. However, the examination did reveal a slightly dilated aorta with a measurement of 4.10 cm. The average diameter is typically below 3.6 cm.    Additionally, we've observed what is known as an atrial septal aneurysm. This condition often doesn't require treatment, but it does warrant further investigation by a specialist. If there happens to be a clot within the aneurysm, it could potentially lead to a stroke.    In light of this, I strongly recommend scheduling a consultation with Dr. James in cardiology to ensure you receive the most appropriate care.    I will place a referral, but do not sweat this right now.  Enjoy your holidays with your wife and family, and we will see you very soon.

## 2024-01-02 ENCOUNTER — OFFICE VISIT (OUTPATIENT)
Dept: CARDIOLOGY | Facility: CLINIC | Age: 74
End: 2024-01-02
Payer: MEDICARE

## 2024-01-02 VITALS
HEIGHT: 71 IN | TEMPERATURE: 98.4 F | DIASTOLIC BLOOD PRESSURE: 79 MMHG | HEART RATE: 99 BPM | SYSTOLIC BLOOD PRESSURE: 169 MMHG | WEIGHT: 214.8 LBS | BODY MASS INDEX: 30.07 KG/M2

## 2024-01-02 DIAGNOSIS — I25.10 CORONARY ARTERY CALCIFICATION SEEN ON CT SCAN: Primary | ICD-10-CM

## 2024-01-02 DIAGNOSIS — I25.3 ATRIAL SEPTAL ANEURYSM: ICD-10-CM

## 2024-01-02 DIAGNOSIS — I71.21 ANEURYSM OF ASCENDING AORTA WITHOUT RUPTURE (CMS-HCC): ICD-10-CM

## 2024-01-02 DIAGNOSIS — I10 BENIGN ESSENTIAL HYPERTENSION: ICD-10-CM

## 2024-01-02 PROCEDURE — 99214 OFFICE O/P EST MOD 30 MIN: CPT | Performed by: INTERNAL MEDICINE

## 2024-01-02 PROCEDURE — 1160F RVW MEDS BY RX/DR IN RCRD: CPT | Performed by: INTERNAL MEDICINE

## 2024-01-02 PROCEDURE — 99204 OFFICE O/P NEW MOD 45 MIN: CPT | Performed by: INTERNAL MEDICINE

## 2024-01-02 PROCEDURE — 3078F DIAST BP <80 MM HG: CPT | Performed by: INTERNAL MEDICINE

## 2024-01-02 PROCEDURE — 1159F MED LIST DOCD IN RCRD: CPT | Performed by: INTERNAL MEDICINE

## 2024-01-02 PROCEDURE — 3077F SYST BP >= 140 MM HG: CPT | Performed by: INTERNAL MEDICINE

## 2024-01-04 ENCOUNTER — APPOINTMENT (OUTPATIENT)
Dept: CARDIOLOGY | Facility: CLINIC | Age: 74
End: 2024-01-04
Payer: MEDICARE

## 2024-05-07 DIAGNOSIS — E79.0 HYPERURICEMIA: Primary | ICD-10-CM

## 2024-05-07 DIAGNOSIS — R97.20 ELEVATED PSA: ICD-10-CM

## 2024-05-07 DIAGNOSIS — E78.2 MIXED HYPERLIPIDEMIA: ICD-10-CM

## 2024-05-07 DIAGNOSIS — I10 BENIGN ESSENTIAL HYPERTENSION: ICD-10-CM

## 2024-05-10 DIAGNOSIS — I10 BENIGN ESSENTIAL HYPERTENSION: ICD-10-CM

## 2024-05-10 DIAGNOSIS — N13.8 BPH WITH OBSTRUCTION/LOWER URINARY TRACT SYMPTOMS: ICD-10-CM

## 2024-05-10 DIAGNOSIS — E79.0 HYPERURICEMIA: ICD-10-CM

## 2024-05-10 DIAGNOSIS — E78.2 MIXED HYPERLIPIDEMIA: ICD-10-CM

## 2024-05-10 DIAGNOSIS — R97.20 ELEVATED PSA: ICD-10-CM

## 2024-05-10 DIAGNOSIS — N40.1 BPH WITH OBSTRUCTION/LOWER URINARY TRACT SYMPTOMS: ICD-10-CM

## 2024-05-14 ENCOUNTER — LAB (OUTPATIENT)
Dept: LAB | Facility: LAB | Age: 74
End: 2024-05-14
Payer: MEDICARE

## 2024-05-14 DIAGNOSIS — I10 BENIGN ESSENTIAL HYPERTENSION: ICD-10-CM

## 2024-05-14 DIAGNOSIS — E79.0 HYPERURICEMIA: ICD-10-CM

## 2024-05-14 DIAGNOSIS — E78.2 MIXED HYPERLIPIDEMIA: ICD-10-CM

## 2024-05-14 LAB
ALBUMIN SERPL BCP-MCNC: 4.2 G/DL (ref 3.4–5)
ALP SERPL-CCNC: 71 U/L (ref 33–136)
ALT SERPL W P-5'-P-CCNC: 19 U/L (ref 10–52)
ANION GAP SERPL CALC-SCNC: 10 MMOL/L (ref 10–20)
AST SERPL W P-5'-P-CCNC: 19 U/L (ref 9–39)
BILIRUB SERPL-MCNC: 0.7 MG/DL (ref 0–1.2)
BUN SERPL-MCNC: 15 MG/DL (ref 6–23)
CALCIUM SERPL-MCNC: 8.7 MG/DL (ref 8.6–10.3)
CHLORIDE SERPL-SCNC: 105 MMOL/L (ref 98–107)
CHOLEST SERPL-MCNC: 145 MG/DL (ref 0–199)
CHOLESTEROL/HDL RATIO: 3.4
CO2 SERPL-SCNC: 26 MMOL/L (ref 21–32)
CREAT SERPL-MCNC: 1.07 MG/DL (ref 0.5–1.3)
EGFRCR SERPLBLD CKD-EPI 2021: 72 ML/MIN/1.73M*2
GLUCOSE SERPL-MCNC: 90 MG/DL (ref 74–99)
HDLC SERPL-MCNC: 42.4 MG/DL
LDLC SERPL CALC-MCNC: 67 MG/DL
NON HDL CHOLESTEROL: 103 MG/DL (ref 0–149)
POTASSIUM SERPL-SCNC: 3.9 MMOL/L (ref 3.5–5.3)
PROT SERPL-MCNC: 6.8 G/DL (ref 6.4–8.2)
SODIUM SERPL-SCNC: 137 MMOL/L (ref 136–145)
TRIGL SERPL-MCNC: 176 MG/DL (ref 0–149)
URATE SERPL-MCNC: 6.2 MG/DL (ref 4–7.5)
VLDL: 35 MG/DL (ref 0–40)

## 2024-05-14 PROCEDURE — 36415 COLL VENOUS BLD VENIPUNCTURE: CPT

## 2024-05-14 PROCEDURE — 80053 COMPREHEN METABOLIC PANEL: CPT

## 2024-05-14 PROCEDURE — 84550 ASSAY OF BLOOD/URIC ACID: CPT

## 2024-05-14 PROCEDURE — 80061 LIPID PANEL: CPT

## 2024-05-14 NOTE — RESULT ENCOUNTER NOTE
We will review in the office on May 16:  Cholesterol 145, 42, 67, 176 previously 142, 46, 54, 205  Sugar, kidney, liver, electrolytes normal  Uric acid 6.2 previous 6.8

## 2024-05-16 ENCOUNTER — OFFICE VISIT (OUTPATIENT)
Dept: PRIMARY CARE | Facility: CLINIC | Age: 74
End: 2024-05-16
Payer: MEDICARE

## 2024-05-16 VITALS
WEIGHT: 215 LBS | BODY MASS INDEX: 30.1 KG/M2 | SYSTOLIC BLOOD PRESSURE: 136 MMHG | HEIGHT: 71 IN | DIASTOLIC BLOOD PRESSURE: 80 MMHG | HEART RATE: 70 BPM

## 2024-05-16 DIAGNOSIS — N40.1 BPH WITH OBSTRUCTION/LOWER URINARY TRACT SYMPTOMS: ICD-10-CM

## 2024-05-16 DIAGNOSIS — J43.1 PANLOBULAR EMPHYSEMA (MULTI): ICD-10-CM

## 2024-05-16 DIAGNOSIS — N13.8 BPH WITH OBSTRUCTION/LOWER URINARY TRACT SYMPTOMS: ICD-10-CM

## 2024-05-16 DIAGNOSIS — E79.0 HYPERURICEMIA: ICD-10-CM

## 2024-05-16 DIAGNOSIS — E78.2 MIXED HYPERLIPIDEMIA: Primary | ICD-10-CM

## 2024-05-16 DIAGNOSIS — I10 BENIGN ESSENTIAL HYPERTENSION: ICD-10-CM

## 2024-05-16 PROBLEM — H92.03 OTALGIA OF BOTH EARS: Status: ACTIVE | Noted: 2024-05-16

## 2024-05-16 PROBLEM — J31.0 CHRONIC RHINITIS: Status: ACTIVE | Noted: 2024-05-16

## 2024-05-16 PROBLEM — M10.9 PODAGRA: Status: ACTIVE | Noted: 2024-05-16

## 2024-05-16 PROBLEM — M54.50 LOW BACK PAIN, UNSPECIFIED: Status: ACTIVE | Noted: 2022-02-01

## 2024-05-16 PROBLEM — L29.0 PRURITUS ANI: Status: ACTIVE | Noted: 2024-05-16

## 2024-05-16 PROBLEM — R20.2 PARESTHESIA OF UPPER EXTREMITY: Status: ACTIVE | Noted: 2024-05-16

## 2024-05-16 PROBLEM — J30.1 ALLERGIC RHINITIS DUE TO POLLEN: Status: ACTIVE | Noted: 2024-05-16

## 2024-05-16 PROBLEM — L98.499 SKIN ULCER (MULTI): Status: RESOLVED | Noted: 2024-05-16 | Resolved: 2024-05-16

## 2024-05-16 PROBLEM — R59.0 CERVICAL LYMPHADENOPATHY: Status: ACTIVE | Noted: 2024-05-16

## 2024-05-16 PROBLEM — F17.210 CIGARETTE SMOKER: Status: ACTIVE | Noted: 2024-05-16

## 2024-05-16 PROBLEM — R53.1 WEAKNESS: Status: ACTIVE | Noted: 2024-05-16

## 2024-05-16 PROBLEM — L98.499 SKIN ULCER (MULTI): Status: ACTIVE | Noted: 2024-05-16

## 2024-05-16 PROBLEM — K64.9 HEMORRHOIDS: Status: ACTIVE | Noted: 2024-05-16

## 2024-05-16 PROBLEM — R14.0 ABDOMINAL BLOATING: Status: ACTIVE | Noted: 2024-05-16

## 2024-05-16 PROBLEM — I49.9 CARDIAC ARRHYTHMIA: Status: ACTIVE | Noted: 2024-05-16

## 2024-05-16 PROBLEM — H61.20 IMPACTED CERUMEN: Status: ACTIVE | Noted: 2024-05-16

## 2024-05-16 PROBLEM — I35.9 CALCIFICATION OF AORTIC VALVE: Status: ACTIVE | Noted: 2023-12-22

## 2024-05-16 PROCEDURE — 99214 OFFICE O/P EST MOD 30 MIN: CPT | Performed by: FAMILY MEDICINE

## 2024-05-16 PROCEDURE — 1160F RVW MEDS BY RX/DR IN RCRD: CPT | Performed by: FAMILY MEDICINE

## 2024-05-16 PROCEDURE — 1123F ACP DISCUSS/DSCN MKR DOCD: CPT | Performed by: FAMILY MEDICINE

## 2024-05-16 PROCEDURE — 3079F DIAST BP 80-89 MM HG: CPT | Performed by: FAMILY MEDICINE

## 2024-05-16 PROCEDURE — 1159F MED LIST DOCD IN RCRD: CPT | Performed by: FAMILY MEDICINE

## 2024-05-16 PROCEDURE — 1158F ADVNC CARE PLAN TLK DOCD: CPT | Performed by: FAMILY MEDICINE

## 2024-05-16 PROCEDURE — 4004F PT TOBACCO SCREEN RCVD TLK: CPT | Performed by: FAMILY MEDICINE

## 2024-05-16 PROCEDURE — 3075F SYST BP GE 130 - 139MM HG: CPT | Performed by: FAMILY MEDICINE

## 2024-05-16 RX ORDER — ALLOPURINOL 100 MG/1
100 TABLET ORAL DAILY
Qty: 90 TABLET | Refills: 1 | Status: SHIPPED | OUTPATIENT
Start: 2024-05-16 | End: 2024-11-12

## 2024-05-16 RX ORDER — ATORVASTATIN CALCIUM 40 MG/1
40 TABLET, FILM COATED ORAL DAILY
Qty: 90 TABLET | Refills: 1 | Status: SHIPPED | OUTPATIENT
Start: 2024-05-16 | End: 2024-11-12

## 2024-05-16 RX ORDER — TADALAFIL 5 MG/1
5 TABLET ORAL DAILY
Qty: 90 TABLET | Refills: 1 | Status: SHIPPED | OUTPATIENT
Start: 2024-05-16 | End: 2024-11-12

## 2024-05-16 RX ORDER — OLMESARTAN MEDOXOMIL 40 MG/1
40 TABLET ORAL DAILY
Qty: 90 TABLET | Refills: 1 | Status: SHIPPED | OUTPATIENT
Start: 2024-05-16 | End: 2024-11-12

## 2024-05-16 ASSESSMENT — PATIENT HEALTH QUESTIONNAIRE - PHQ9
SUM OF ALL RESPONSES TO PHQ9 QUESTIONS 1 AND 2: 0
1. LITTLE INTEREST OR PLEASURE IN DOING THINGS: NOT AT ALL
2. FEELING DOWN, DEPRESSED OR HOPELESS: NOT AT ALL

## 2024-05-16 NOTE — PROGRESS NOTES
Subjective   Patient ID: Leonides Izaguirre is a 73 y.o. male who presents for Hypertension, Gout, and Erectile Dysfunction.    Past Medical, Surgical, and Family History reviewed and updated in chart.    Reviewed all medications by prescribing practitioner or clinical pharmacist (such as prescriptions, OTCs, herbal therapies and supplements) and documented in the medical record.    HPI  1.  BPH.  Stable, currently taking Cialis 5 mg daily  Prostate cancer screening test has been stable with a recent PSA of 1.60    2.  Hypertension.  BP looks excellent on intake, no reported chest pain or shortness of breath  Currently taking medication noted on the chart, requesting refill    3.  Hyperuricemia.  Currently taking allopurinol with recent uric acid level noted to be 6.2    4.  Hyperlipidemia.  Excellent lipid panel, triglycerides improved by nearly 30 points since last visit  Tolerates medication well, requesting refill    As an aside, Bill is requesting all medication to be printed on paper    Review of Systems  All pertinent positive symptoms are included in the history of present illness.    All other systems have been reviewed and are negative and noncontributory to this patient's current ailments.    Past Medical History:   Diagnosis Date    Aneurysm of ascending aorta without rupture (CMS-HCC) 01/02/2024    Atrial septal aneurysm 01/02/2024    Encounter for screening for cardiovascular disorders 06/11/2021    Screening for AAA (abdominal aortic aneurysm)    Essential (primary) hypertension 12/01/2022    Benign essential hypertension    Hyperlipidemia, unspecified 12/01/2022    Hyperlipidemia    Nicotine dependence, unspecified, uncomplicated 05/23/2022    Tobacco dependence     Past Surgical History:   Procedure Laterality Date    OTHER SURGICAL HISTORY  12/12/2013    Prior Surgical Procedure Not Done     Social History     Tobacco Use    Smoking status: Every Day     Types: Cigarettes    Smokeless tobacco: Never  "    Family History   Problem Relation Name Age of Onset    Esophageal cancer Mother      Colon cancer Mother      Esophageal cancer Maternal Grandmother       Immunization History   Administered Date(s) Administered    Flu vaccine, quadrivalent, high-dose, preservative free, age 65y+ (FLUZONE) 09/18/2020    Influenza, High Dose Seasonal, Preservative Free 11/16/2019, 09/19/2020, 10/12/2022    Influenza, Seasonal, Quadrivalent, Adjuvanted 10/19/2021    Influenza, injectable, quadrivalent 11/27/2019    Moderna SARS-CoV-2 Vaccination 02/04/2021, 03/04/2021    Pfizer COVID-19 vaccine, Fall 2023, 12 years and older, (30mcg/0.3mL) 01/04/2024    Pfizer COVID-19 vaccine, bivalent, age 12 years and older (30 mcg/0.3 mL) 11/15/2022    Pfizer Gray Cap SARS-CoV-2 05/20/2022    Pfizer Purple Cap SARS-CoV-2 11/16/2021, 10/12/2022    Pneumococcal conjugate vaccine, 13-valent (PREVNAR 13) 11/29/2017    Pneumococcal polysaccharide vaccine, 23-valent, age 2 years and older (PNEUMOVAX 23) 05/21/2013, 02/12/2019    Tdap vaccine, age 7 year and older (BOOSTRIX, ADACEL) 11/29/2017    Zoster vaccine, recombinant, adult (SHINGRIX) 07/28/2019    Zoster, Unspecified 10/23/2019    Zoster, live 05/21/2013     Current Outpatient Medications   Medication Instructions    allopurinol (ZYLOPRIM) 100 mg, oral, Daily    atorvastatin (LIPITOR) 40 mg, oral, Daily    fluocinolone (DermOtic) 0.01 % ear drops 5 drops, Each Ear, 2 times daily    olmesartan (BENICAR) 40 mg, oral, Daily    tadalafil (CIALIS) 5 mg, oral, Daily     Allergies   Allergen Reactions    Amlodipine Swelling       Objective   Vitals:    05/16/24 1306   BP: 136/80   Pulse: 70   Weight: 97.5 kg (215 lb)   Height: 1.803 m (5' 11\")     Body mass index is 29.99 kg/m².    BP Readings from Last 3 Encounters:   05/16/24 136/80   01/02/24 169/79   11/28/23 132/70      Wt Readings from Last 3 Encounters:   05/16/24 97.5 kg (215 lb)   01/02/24 97.4 kg (214 lb 12.8 oz)   11/28/23 97.1 kg (214 " lb)        Lab on 05/14/2024   Component Date Value    Cholesterol 05/14/2024 145     HDL-Cholesterol 05/14/2024 42.4     Cholesterol/HDL Ratio 05/14/2024 3.4     LDL Calculated 05/14/2024 67     VLDL 05/14/2024 35     Triglycerides 05/14/2024 176 (H)     Non HDL Cholesterol 05/14/2024 103     Glucose 05/14/2024 90     Sodium 05/14/2024 137     Potassium 05/14/2024 3.9     Chloride 05/14/2024 105     Bicarbonate 05/14/2024 26     Anion Gap 05/14/2024 10     Urea Nitrogen 05/14/2024 15     Creatinine 05/14/2024 1.07     eGFR 05/14/2024 72     Calcium 05/14/2024 8.7     Albumin 05/14/2024 4.2     Alkaline Phosphatase 05/14/2024 71     Total Protein 05/14/2024 6.8     AST 05/14/2024 19     Bilirubin, Total 05/14/2024 0.7     ALT 05/14/2024 19     Uric Acid 05/14/2024 6.2      Physical Exam  CONSTITUTIONAL - well nourished, well developed, looks like stated age, in no acute distress, not ill-appearing, and not tired appearing  SKIN - normal skin color and pigmentation  EYES - normal external exam  LUNGS - breathing comfortably, no dyspnea  EXTREMITIES - no deformities noticeable  NEUROLOGICAL - oriented and no focal signs  PSYCHIATRIC - alert, pleasant and cordial, age-appropriate, not anxious or depressed appearing    Assessment/Plan   Problem List Items Addressed This Visit       Benign essential hypertension     Stable, no changes to medication recommended  I would like to have you monitor and record blood pressures at home   Blood pressure goal should be below 130/80, ideally 120/80  If the blood pressure is too high or too low, we need to consider making adjustments to your antihypertensive therapy         Relevant Medications    olmesartan (BENIcar) 40 mg tablet    BPH with obstruction/lower urinary tract symptoms     Continue current dose of Cialis at 5 mg daily         Relevant Medications    tadalafil (Cialis) 5 mg tablet    Emphysema lung (Multi)     You have been seen by pulmonology in the past, please  follow-up if symptoms worsen         Hyperlipidemia - Primary     Stable, no changes to medication recommended         Relevant Medications    atorvastatin (Lipitor) 40 mg tablet    Hyperuricemia     Stable, no changes to medication recommended         Relevant Medications    allopurinol (Zyloprim) 100 mg tablet

## 2024-08-15 ENCOUNTER — APPOINTMENT (OUTPATIENT)
Dept: PRIMARY CARE | Facility: CLINIC | Age: 74
End: 2024-08-15
Payer: MEDICARE

## 2024-08-15 VITALS
HEART RATE: 66 BPM | DIASTOLIC BLOOD PRESSURE: 88 MMHG | SYSTOLIC BLOOD PRESSURE: 142 MMHG | BODY MASS INDEX: 29.26 KG/M2 | WEIGHT: 209 LBS | HEIGHT: 71 IN

## 2024-08-15 DIAGNOSIS — N40.1 BPH WITH OBSTRUCTION/LOWER URINARY TRACT SYMPTOMS: ICD-10-CM

## 2024-08-15 DIAGNOSIS — I10 BENIGN ESSENTIAL HYPERTENSION: ICD-10-CM

## 2024-08-15 DIAGNOSIS — I71.21 ANEURYSM OF ASCENDING AORTA WITHOUT RUPTURE (CMS-HCC): ICD-10-CM

## 2024-08-15 DIAGNOSIS — D48.5 NEOPLASM OF UNCERTAIN BEHAVIOR OF SKIN OF KNEE: ICD-10-CM

## 2024-08-15 DIAGNOSIS — K63.5 MULTIPLE BENIGN POLYPS OF LARGE INTESTINE: ICD-10-CM

## 2024-08-15 DIAGNOSIS — E78.2 MIXED HYPERLIPIDEMIA: ICD-10-CM

## 2024-08-15 DIAGNOSIS — Z00.00 MEDICARE ANNUAL WELLNESS VISIT, SUBSEQUENT: Primary | ICD-10-CM

## 2024-08-15 DIAGNOSIS — E79.0 HYPERURICEMIA: ICD-10-CM

## 2024-08-15 DIAGNOSIS — N13.8 BPH WITH OBSTRUCTION/LOWER URINARY TRACT SYMPTOMS: ICD-10-CM

## 2024-08-15 DIAGNOSIS — D48.5 NEOPLASM OF UNCERTAIN BEHAVIOR OF SKIN OF FACE: ICD-10-CM

## 2024-08-15 PROCEDURE — G2211 COMPLEX E/M VISIT ADD ON: HCPCS | Performed by: FAMILY MEDICINE

## 2024-08-15 PROCEDURE — 1170F FXNL STATUS ASSESSED: CPT | Performed by: FAMILY MEDICINE

## 2024-08-15 PROCEDURE — 3079F DIAST BP 80-89 MM HG: CPT | Performed by: FAMILY MEDICINE

## 2024-08-15 PROCEDURE — 11102 TANGNTL BX SKIN SINGLE LES: CPT | Performed by: FAMILY MEDICINE

## 2024-08-15 PROCEDURE — G0439 PPPS, SUBSEQ VISIT: HCPCS | Performed by: FAMILY MEDICINE

## 2024-08-15 PROCEDURE — 3077F SYST BP >= 140 MM HG: CPT | Performed by: FAMILY MEDICINE

## 2024-08-15 PROCEDURE — 1159F MED LIST DOCD IN RCRD: CPT | Performed by: FAMILY MEDICINE

## 2024-08-15 PROCEDURE — 4004F PT TOBACCO SCREEN RCVD TLK: CPT | Performed by: FAMILY MEDICINE

## 2024-08-15 PROCEDURE — 11103 TANGNTL BX SKIN EA SEP/ADDL: CPT | Performed by: FAMILY MEDICINE

## 2024-08-15 PROCEDURE — 99214 OFFICE O/P EST MOD 30 MIN: CPT | Performed by: FAMILY MEDICINE

## 2024-08-15 PROCEDURE — 1123F ACP DISCUSS/DSCN MKR DOCD: CPT | Performed by: FAMILY MEDICINE

## 2024-08-15 ASSESSMENT — PATIENT HEALTH QUESTIONNAIRE - PHQ9
SUM OF ALL RESPONSES TO PHQ9 QUESTIONS 1 AND 2: 0
2. FEELING DOWN, DEPRESSED OR HOPELESS: NOT AT ALL
1. LITTLE INTEREST OR PLEASURE IN DOING THINGS: SEVERAL DAYS
2. FEELING DOWN, DEPRESSED OR HOPELESS: NOT AT ALL
SUM OF ALL RESPONSES TO PHQ9 QUESTIONS 1 AND 2: 1
10. IF YOU CHECKED OFF ANY PROBLEMS, HOW DIFFICULT HAVE THESE PROBLEMS MADE IT FOR YOU TO DO YOUR WORK, TAKE CARE OF THINGS AT HOME, OR GET ALONG WITH OTHER PEOPLE: SOMEWHAT DIFFICULT
1. LITTLE INTEREST OR PLEASURE IN DOING THINGS: NOT AT ALL

## 2024-08-15 ASSESSMENT — ACTIVITIES OF DAILY LIVING (ADL)
MANAGING_FINANCES: INDEPENDENT
BATHING: INDEPENDENT
GROCERY_SHOPPING: INDEPENDENT
DRESSING: INDEPENDENT
TAKING_MEDICATION: INDEPENDENT
DOING_HOUSEWORK: INDEPENDENT

## 2024-08-15 ASSESSMENT — ENCOUNTER SYMPTOMS
OCCASIONAL FEELINGS OF UNSTEADINESS: 0
DEPRESSION: 0
LOSS OF SENSATION IN FEET: 1

## 2024-08-15 NOTE — ASSESSMENT & PLAN NOTE
CT chest due in December 2024   We will assess after CT scan   Continue follow up with cardiology

## 2024-08-15 NOTE — PROGRESS NOTES
Subjective   Reason for Visit: Leonides Izaguirre is an 73 y.o. male here for a Medicare Annual Wellness Visit Subsequent and Skin Check (Has some spots he wants looked at to make sure ).     Past Medical, Surgical, and Family History reviewed and updated in chart.    Reviewed all medications by prescribing practitioner or clinical pharmacist (such as prescriptions, OTCs, herbal therapies and supplements) and documented in the medical record.    HPI  1. Medicare wellness  Bill's immunizations are up to date.  Colon cancer screening: Last performed in September 2022, multiple polyps were found, and clearance was given for 2 years. He is due for a follow-up in September 2024.  Prostate cancer screening: PSA levels were 1.69 in June 2023 and 1.60 in November 2023.  Tobacco use: Continues to use tobacco daily.  A CT scan was last performed in December 2023, showing stable benign small pulmonary nodules. Annual screening is advised.  Alcohol use: Occasional.    2. Hypertension  Blood pressure today on intake was 142/88, with a recheck of 138/85.  Currently taking olmesartan 40 mg daily and tolerating the medication well.  Denies chest pain, shortness of breath, headache, dizziness, or leg edema.    3. Hyperlipidemia  Currently taking atorvastatin 40 mg daily.  A lipid panel performed in May 2024 showed cholesterol levels of 145, HDL 42.4, LDL 67, and total cholesterol 176.  Triglycerides have improved from 243 in November 2022.  Denies myalgias and tolerates the medication well. He is requesting a refill.    4. Hyperuricemia  Using allopurinol 100 mg daily.  The last uric acid test in May 2024 showed a level of 6.2.  He has not had any gout flares and does not wish to change the medication dose.    5. BPH with LUTS  Stable, currently taking Cialis 5 mg daily.  Prostate cancer screening has been stable, with a recent PSA of 1.60 in November 2023.    6. Dilatation of ascending aorta  A low-dose CT scan of the lung performed  in November 2022 indicated that his ascending aorta is 4.1 cm and stable with prior exams.  The condition remained stable in a CT done in December 2023. Annual screening is recommended.  Denies shortness of breath or chest pain.  He is due for a repeated low-dose CT in December 2024.    7. Abnormal skin lesions  The patient has noticed two small spots on his right facial area and one on his right knee.  He states that the color and borders have not changed since he noticed them a week and a half ago.  He has a history of skin cancer that required Moh's surgery and is concerned due to this history.     Review of Systems  All pertinent positive symptoms are included in the history of present illness.    All other systems have been reviewed and are negative and noncontributory to this patient's current ailments.    Past Medical History:   Diagnosis Date    Aneurysm of ascending aorta without rupture (CMS-HCC) 01/02/2024    Atrial septal aneurysm 01/02/2024    Encounter for screening for cardiovascular disorders 06/11/2021    Screening for AAA (abdominal aortic aneurysm)    Essential (primary) hypertension 12/01/2022    Benign essential hypertension    Hyperlipidemia, unspecified 12/01/2022    Hyperlipidemia    Nicotine dependence, unspecified, uncomplicated 05/23/2022    Tobacco dependence     Past Surgical History:   Procedure Laterality Date    OTHER SURGICAL HISTORY  12/12/2013    Prior Surgical Procedure Not Done     Social History     Tobacco Use    Smoking status: Every Day     Types: Cigarettes    Smokeless tobacco: Never     Family History   Problem Relation Name Age of Onset    Esophageal cancer Mother      Colon cancer Mother      Esophageal cancer Maternal Grandmother       Allergies   Allergen Reactions    Amlodipine Swelling     Immunization History   Administered Date(s) Administered    Flu vaccine, quadrivalent, high-dose, preservative free, age 65y+ (FLUZONE) 09/18/2020    Flu vaccine, trivalent,  "preservative free, HIGH-DOSE, age 65y+ (Fluzone) 11/16/2019, 09/19/2020, 10/12/2022    Influenza, Seasonal, Quadrivalent, Adjuvanted 10/19/2021    Influenza, injectable, quadrivalent 11/27/2019    Moderna SARS-CoV-2 Vaccination 02/04/2021, 03/04/2021    Pfizer COVID-19 vaccine, Fall 2023, 12 years and older, (30mcg/0.3mL) 01/04/2024    Pfizer COVID-19 vaccine, bivalent, age 12 years and older (30 mcg/0.3 mL) 11/15/2022    Pfizer Gray Cap SARS-CoV-2 05/20/2022    Pfizer Purple Cap SARS-CoV-2 11/16/2021, 10/12/2022    Pneumococcal conjugate vaccine, 13-valent (PREVNAR 13) 11/29/2017    Pneumococcal polysaccharide vaccine, 23-valent, age 2 years and older (PNEUMOVAX 23) 05/21/2013, 02/12/2019    Tdap vaccine, age 7 year and older (BOOSTRIX, ADACEL) 11/29/2017    Zoster vaccine, recombinant, adult (SHINGRIX) 07/28/2019    Zoster, Unspecified 10/23/2019    Zoster, live 05/21/2013     Current Outpatient Medications   Medication Instructions    allopurinol (ZYLOPRIM) 100 mg, oral, Daily    atorvastatin (LIPITOR) 40 mg, oral, Daily    fluocinolone (DermOtic) 0.01 % ear drops 5 drops, Each Ear, 2 times daily    olmesartan (BENICAR) 40 mg, oral, Daily    tadalafil (CIALIS) 5 mg, oral, Daily     Patient Self Assessment of Health Status  Patient Self Assessment: Good    Nutrition and Exercise  Current Diet: Well Balanced Diet  Adequate Fluid Intake: Yes  Caffeine: Yes  Exercise Frequency: Infrequently    Functional Ability/Level of Safety  Cognitive Impairment Observed: No cognitive impairment observed  Cognitive Impairment Reported: No cognitive impairment reported by patient or family    Home Safety Risk Factors: None    Objective   Visit Vitals  /88   Pulse 66   Ht 1.803 m (5' 11\")   Wt 94.8 kg (209 lb)   BMI 29.15 kg/m²   Smoking Status Every Day   BSA 2.18 m²      No visits with results within 1 Month(s) from this visit.   Latest known visit with results is:   Lab on 05/14/2024   Component Date Value    Cholesterol " 05/14/2024 145     HDL-Cholesterol 05/14/2024 42.4     Cholesterol/HDL Ratio 05/14/2024 3.4     LDL Calculated 05/14/2024 67     VLDL 05/14/2024 35     Triglycerides 05/14/2024 176 (H)     Non HDL Cholesterol 05/14/2024 103     Glucose 05/14/2024 90     Sodium 05/14/2024 137     Potassium 05/14/2024 3.9     Chloride 05/14/2024 105     Bicarbonate 05/14/2024 26     Anion Gap 05/14/2024 10     Urea Nitrogen 05/14/2024 15     Creatinine 05/14/2024 1.07     eGFR 05/14/2024 72     Calcium 05/14/2024 8.7     Albumin 05/14/2024 4.2     Alkaline Phosphatase 05/14/2024 71     Total Protein 05/14/2024 6.8     AST 05/14/2024 19     Bilirubin, Total 05/14/2024 0.7     ALT 05/14/2024 19     Uric Acid 05/14/2024 6.2      The 10-year ASCVD risk score (Marybeth UMANA, et al., 2019) is: 34%    Values used to calculate the score:      Age: 73 years      Sex: Male      Is Non- : No      Diabetic: No      Tobacco smoker: Yes      Systolic Blood Pressure: 142 mmHg      Is BP treated: Yes      HDL Cholesterol: 42.4 mg/dL      Total Cholesterol: 145 mg/dL    Procedures  Right cheek lesion:  Risks, benefits, and options of shave biopsy(ies) of lesion(s) discussed in detail  1% lidocaine into lesion(s) after cleansing in sterile fashion; small sterilized razor used for shave biopsy; minimal bleeding with Drysol cautery; tolerated well without complications; bacitracin ointment along with bandage was applied    Right knee lesion:  Risks, benefits, and options of shave biopsy(ies) of lesion(s) discussed in detail    1% lidocaine into lesion(s) after cleansing in sterile fashion; small sterilized razor used for shave biopsy; minimal bleeding with Drysol cautery; tolerated well without complications; bacitracin ointment along with bandage was applied    Physical Exam  CONSTITUTIONAL - well nourished, well developed, looks like stated age, in no acute distress, not ill-appearing, and not tired appearing  SKIN - normal skin  color and skin turgor, pinpoint, flat, dark, 1 mm lesion right cheek; right knee lesion, hyperpigmented, flat, deep, 2 mm       RIGHT CHEEK    HEAD - no trauma, normocephalic  EYES - extraocular muscles are intact, and normal external exam  ENT - TM's intact, no injection, no signs of infection, uvula midline, normal tongue movement and throat normal, no exudate  NECK - supple without rigidity, no neck mass was observed, no thyromegaly or thyroid nodules  CHEST - clear to auscultation, no wheezing, no crackles and no rales, good effort  CARDIAC - regular rate and regular rhythm, no skipped beats, no murmur  ABDOMEN - no organomegaly, soft, nontender, nondistended, normal bowel sounds, no guarding/rebound/rigidity, negative McBurney sign and negative Romero sign  EXTREMITIES - no obvious or evident edema, no obvious or evident deformities  NEUROLOGICAL - normal gait, normal balance, normal motor, no ataxia; alert, oriented and no focal signs  PSYCHIATRIC - alert, pleasant and cordial, age-appropriate  IMMUNOLOGIC - no cervical lymphadenopathy    Assessment/Plan   Problem List Items Addressed This Visit       Benign essential hypertension    Current Assessment & Plan     Stable, no changes to medication recommended  I would like to have you monitor and record blood pressures at home   Blood pressure goal should be below 130/80, ideally 120/80  If the blood pressure is too high or too low, we need to consider making adjustments to your antihypertensive therapy         BPH with obstruction/lower urinary tract symptoms    Current Assessment & Plan     Continue current dose of Cialis at 5 mg daily         Hyperlipidemia    Current Assessment & Plan     Stable, no changes to medication recommended         Hyperuricemia    Current Assessment & Plan     Stable, no changes to medication recommended  Continue monitoring signs/symptoms         Aneurysm of ascending aorta without rupture (CMS-HCC)    Current Assessment & Plan      CT chest due in December 2024   We will assess after CT scan   Continue follow up with cardiology          Medicare annual wellness visit, subsequent - Primary    Current Assessment & Plan     Questions answered and reviewed  Immunizations up-to-date  Another colonoscopy is due in September, we will get that ordered         Neoplasm of uncertain behavior of skin of face    Current Assessment & Plan     Verbal instructions on wound care given; will send off lesion(s) for pathology and notify of results once available         Relevant Orders    Dermatopathology- DERM LAB    Neoplasm of uncertain behavior of skin of knee    Current Assessment & Plan     Verbal instructions on wound care given; will send off lesion(s) for pathology and notify of results once available         Relevant Orders    Dermatopathology- DERM LAB    Multiple benign polyps of large intestine    Relevant Orders    Colonoscopy Screening; High Risk Patient; multiple colonic polyps     Patient Care Team:  Michael Huddleston DO as PCP - General (Family Medicine)  Michael Huddleston DO as PCP - [facility] ACO Attributed Provider

## 2024-08-15 NOTE — ASSESSMENT & PLAN NOTE
Questions answered and reviewed  Immunizations up-to-date  Another colonoscopy is due in September, we will get that ordered

## 2024-08-15 NOTE — ASSESSMENT & PLAN NOTE
Verbal instructions on wound care given; will send off lesion(s) for pathology and notify of results once available

## 2024-08-21 LAB
LAB AP ASR DISCLAIMER: NORMAL
LABORATORY COMMENT REPORT: NORMAL
PATH REPORT.FINAL DX SPEC: NORMAL
PATH REPORT.GROSS SPEC: NORMAL
PATH REPORT.MICROSCOPIC SPEC OTHER STN: NORMAL
PATH REPORT.RELEVANT HX SPEC: NORMAL
PATH REPORT.TOTAL CANCER: NORMAL

## 2024-09-18 DIAGNOSIS — D12.6 TUBULAR ADENOMA OF COLON: ICD-10-CM

## 2024-09-18 RX ORDER — SOD SULF/POT CHLORIDE/MAG SULF 1.479 G
TABLET ORAL
Qty: 24 TABLET | Refills: 0 | Status: SHIPPED | OUTPATIENT
Start: 2024-09-18

## 2024-09-25 ENCOUNTER — LAB (OUTPATIENT)
Dept: LAB | Facility: LAB | Age: 74
End: 2024-09-25
Payer: MEDICARE

## 2024-09-25 ENCOUNTER — OFFICE VISIT (OUTPATIENT)
Dept: PRIMARY CARE | Facility: CLINIC | Age: 74
End: 2024-09-25
Payer: MEDICARE

## 2024-09-25 ENCOUNTER — APPOINTMENT (OUTPATIENT)
Dept: PRIMARY CARE | Facility: CLINIC | Age: 74
End: 2024-09-25
Payer: MEDICARE

## 2024-09-25 VITALS
SYSTOLIC BLOOD PRESSURE: 132 MMHG | HEIGHT: 71 IN | DIASTOLIC BLOOD PRESSURE: 76 MMHG | WEIGHT: 205 LBS | BODY MASS INDEX: 28.7 KG/M2 | HEART RATE: 86 BPM

## 2024-09-25 DIAGNOSIS — R63.4 WEIGHT LOSS: Primary | ICD-10-CM

## 2024-09-25 DIAGNOSIS — R63.4 WEIGHT LOSS: ICD-10-CM

## 2024-09-25 LAB
ALBUMIN SERPL BCP-MCNC: 4.5 G/DL (ref 3.4–5)
ALP SERPL-CCNC: 82 U/L (ref 33–136)
ALT SERPL W P-5'-P-CCNC: 12 U/L (ref 10–52)
ANION GAP SERPL CALC-SCNC: 9 MMOL/L (ref 10–20)
AST SERPL W P-5'-P-CCNC: 15 U/L (ref 9–39)
BASOPHILS # BLD AUTO: 0.05 X10*3/UL (ref 0–0.1)
BASOPHILS NFR BLD AUTO: 0.5 %
BILIRUB SERPL-MCNC: 0.5 MG/DL (ref 0–1.2)
BUN SERPL-MCNC: 16 MG/DL (ref 6–23)
CALCIUM SERPL-MCNC: 8.8 MG/DL (ref 8.6–10.3)
CHLORIDE SERPL-SCNC: 106 MMOL/L (ref 98–107)
CO2 SERPL-SCNC: 29 MMOL/L (ref 21–32)
CREAT SERPL-MCNC: 1.2 MG/DL (ref 0.5–1.3)
EGFRCR SERPLBLD CKD-EPI 2021: 63 ML/MIN/1.73M*2
EOSINOPHIL # BLD AUTO: 0.13 X10*3/UL (ref 0–0.4)
EOSINOPHIL NFR BLD AUTO: 1.4 %
ERYTHROCYTE [DISTWIDTH] IN BLOOD BY AUTOMATED COUNT: 14 % (ref 11.5–14.5)
GLUCOSE SERPL-MCNC: 122 MG/DL (ref 74–99)
HCT VFR BLD AUTO: 48.2 % (ref 41–52)
HGB BLD-MCNC: 15.2 G/DL (ref 13.5–17.5)
IMM GRANULOCYTES # BLD AUTO: 0.06 X10*3/UL (ref 0–0.5)
IMM GRANULOCYTES NFR BLD AUTO: 0.6 % (ref 0–0.9)
LYMPHOCYTES # BLD AUTO: 2.59 X10*3/UL (ref 0.8–3)
LYMPHOCYTES NFR BLD AUTO: 27 %
MCH RBC QN AUTO: 30.8 PG (ref 26–34)
MCHC RBC AUTO-ENTMCNC: 31.5 G/DL (ref 32–36)
MCV RBC AUTO: 98 FL (ref 80–100)
MONOCYTES # BLD AUTO: 0.77 X10*3/UL (ref 0.05–0.8)
MONOCYTES NFR BLD AUTO: 8 %
NEUTROPHILS # BLD AUTO: 6.01 X10*3/UL (ref 1.6–5.5)
NEUTROPHILS NFR BLD AUTO: 62.5 %
NRBC BLD-RTO: 0 /100 WBCS (ref 0–0)
PLATELET # BLD AUTO: 230 X10*3/UL (ref 150–450)
POTASSIUM SERPL-SCNC: 4.4 MMOL/L (ref 3.5–5.3)
PROT SERPL-MCNC: 7.4 G/DL (ref 6.4–8.2)
RBC # BLD AUTO: 4.94 X10*6/UL (ref 4.5–5.9)
SODIUM SERPL-SCNC: 140 MMOL/L (ref 136–145)
WBC # BLD AUTO: 9.6 X10*3/UL (ref 4.4–11.3)

## 2024-09-25 PROCEDURE — 1123F ACP DISCUSS/DSCN MKR DOCD: CPT | Performed by: FAMILY MEDICINE

## 2024-09-25 PROCEDURE — 80053 COMPREHEN METABOLIC PANEL: CPT

## 2024-09-25 PROCEDURE — 3078F DIAST BP <80 MM HG: CPT | Performed by: FAMILY MEDICINE

## 2024-09-25 PROCEDURE — 1159F MED LIST DOCD IN RCRD: CPT | Performed by: FAMILY MEDICINE

## 2024-09-25 PROCEDURE — 85025 COMPLETE CBC W/AUTO DIFF WBC: CPT

## 2024-09-25 PROCEDURE — 99214 OFFICE O/P EST MOD 30 MIN: CPT | Performed by: FAMILY MEDICINE

## 2024-09-25 PROCEDURE — 4004F PT TOBACCO SCREEN RCVD TLK: CPT | Performed by: FAMILY MEDICINE

## 2024-09-25 PROCEDURE — 3075F SYST BP GE 130 - 139MM HG: CPT | Performed by: FAMILY MEDICINE

## 2024-09-25 PROCEDURE — 1160F RVW MEDS BY RX/DR IN RCRD: CPT | Performed by: FAMILY MEDICINE

## 2024-09-25 PROCEDURE — 36415 COLL VENOUS BLD VENIPUNCTURE: CPT

## 2024-09-25 NOTE — PROGRESS NOTES
Subjective   Patient ID: Leonides Izaguirre is a 73 y.o. male who presents for Weight Loss.    Past Medical, Surgical, and Family History reviewed and updated in chart.    Reviewed all medications by prescribing practitioner or clinical pharmacist (such as prescriptions, OTCs, herbal therapies and supplements) and documented in the medical record.    HUNTER Riley is presenting with a 14-pound weight loss since January 2024. He reports muscle loss in his biceps and calves. He had multiple polyps removed during his last colonoscopy two years ago and has a colonoscopy scheduled for October 21, 2024. He denies any changes to his diet or activity levels but admits to experiencing early satiety, particularly at lunch and dinner. For example, he mentions that sometimes for lunch he will eat a package of crackers (approximately 200 calories) and a cup of coffee, after which he feels full. Similarly, at dinner, he often eats only a portion of his meal.    A low-dose CT scan of the lungs performed on December 5, 2023, showed stable benign small pulmonary nodules in the context of underlying smoking-related lung disease, with an unchanged 1.2 cm right lower paratracheal node containing a small amount of fat in the hilum. He has a repeat low-dose lung CT scheduled for December.    Osvaldo denies any changes in the size or frequency of his bowel movements, chest pain, shortness of breath, cough, fever, chills, headache, hematochezia, or hemoptysis.    Review of Systems  All pertinent positive symptoms are included in the history of present illness.  All other systems have been reviewed and are negative and noncontributory to this patient's current ailments.    Past Medical History:   Diagnosis Date    Aneurysm of ascending aorta without rupture (CMS-HCC) 01/02/2024    Atrial septal aneurysm 01/02/2024    Encounter for screening for cardiovascular disorders 06/11/2021    Screening for AAA (abdominal aortic aneurysm)    Essential (primary)  hypertension 12/01/2022    Benign essential hypertension    Hyperlipidemia, unspecified 12/01/2022    Hyperlipidemia    Nicotine dependence, unspecified, uncomplicated 05/23/2022    Tobacco dependence     Past Surgical History:   Procedure Laterality Date    OTHER SURGICAL HISTORY  12/12/2013    Prior Surgical Procedure Not Done     Social History     Tobacco Use    Smoking status: Every Day     Types: Cigarettes    Smokeless tobacco: Never     Family History   Problem Relation Name Age of Onset    Esophageal cancer Mother      Colon cancer Mother      Esophageal cancer Maternal Grandmother       Immunization History   Administered Date(s) Administered    Flu vaccine, quadrivalent, high-dose, preservative free, age 65y+ (FLUZONE) 09/18/2020    Flu vaccine, trivalent, preservative free, HIGH-DOSE, age 65y+ (Fluzone) 11/16/2019, 09/19/2020, 10/12/2022, 09/19/2024    Influenza, Seasonal, Quadrivalent, Adjuvanted 10/19/2021    Influenza, injectable, quadrivalent 11/27/2019    Moderna COVID-19 vaccine, 12 years and older (50mcg/0.5mL)(Spikevax) 09/19/2024    Moderna SARS-CoV-2 Vaccination 02/04/2021, 03/04/2021    Pfizer COVID-19 vaccine, 12 years and older, (30mcg/0.3mL) (Comirnaty) 01/04/2024    Pfizer COVID-19 vaccine, bivalent, age 12 years and older (30 mcg/0.3 mL) 11/15/2022    Pfizer Gray Cap SARS-CoV-2 05/20/2022    Pfizer Purple Cap SARS-CoV-2 11/16/2021, 10/12/2022    Pneumococcal conjugate vaccine, 13-valent (PREVNAR 13) 11/29/2017    Pneumococcal polysaccharide vaccine, 23-valent, age 2 years and older (PNEUMOVAX 23) 05/21/2013, 02/12/2019    Tdap vaccine, age 7 year and older (BOOSTRIX, ADACEL) 11/29/2017    Zoster vaccine, recombinant, adult (SHINGRIX) 07/28/2019    Zoster, Unspecified 10/23/2019    Zoster, live 05/21/2013     Current Outpatient Medications   Medication Instructions    allopurinol (ZYLOPRIM) 100 mg, oral, Daily    atorvastatin (LIPITOR) 40 mg, oral, Daily    fluocinolone (DermOtic) 0.01 %  "ear drops 5 drops, Each Ear, 2 times daily    olmesartan (BENICAR) 40 mg, oral, Daily    sod sulf-pot chloride-mag sulf (Sutab) 1.479-0.188- 0.225 gram tablet Starting at 6pm open one bottle of pills and fill glass provided with water and drink according to prep sheet. Start the 2nd bottle with directions on prep sheet 5 hours before procedure time    tadalafil (CIALIS) 5 mg, oral, Daily     Allergies   Allergen Reactions    Amlodipine Swelling     Objective   Vitals:    09/25/24 1407 09/25/24 1447   BP: 146/70 132/76   Pulse: 86    Weight: 93 kg (205 lb)    Height: 1.803 m (5' 11\")      Body mass index is 28.59 kg/m².    BP Readings from Last 3 Encounters:   09/25/24 132/76   08/15/24 142/88   05/16/24 136/80      Wt Readings from Last 3 Encounters:   09/25/24 93 kg (205 lb)   08/15/24 94.8 kg (209 lb)   05/16/24 97.5 kg (215 lb)      Physical Exam  CONSTITUTIONAL - well nourished, well developed, a bit thinner appearing, looks like stated age, in no acute distress, not ill-appearing, and not tired appearing  SKIN - normal skin color and pigmentation, normal skin turgor without rash, lesions, or nodules visualized  HEAD - no trauma, normocephalic  EYES - EOMI, anicteric sclera  NECK - supple without rigidity, no neck mass was observed, no thyromegaly or thyroid nodules  CHEST - clear to auscultation, no wheezing, no crackles and no rales, good effort  CARDIAC - regular rate and regular rhythm, no skipped beats, no murmur  ABDOMEN - no organomegaly, soft, nontender, nondistended, normal bowel sounds, no guarding/rebound/rigidity  EXTREMITIES - no obvious or evident edema, no obvious or evident deformities  NEUROLOGICAL - alert, oriented and no focal signs  PSYCHIATRIC - alert, pleasant and cordial, age-appropriate    Assessment/Plan   Problem List Items Addressed This Visit       Weight loss - Primary     Seek Immediate Medical Attention: Please seek immediate medical attention if you experience new or worsening " symptoms such as severe abdominal pain, significant changes in bowel habits, chest pain, shortness of breath, or hemoptysis.    Food Diary: I recommend maintaining a detailed food diary to help identify any patterns or issues with your food intake.    Smoking Cessation: We will provide support to help you with smoking cessation.    Meal Frequency: To manage early satiety, try to consume smaller, more frequent meals and snacks.    Nutritional Supplements: Consider incorporating nutritional supplements like Ensure or Boost to increase your caloric intake.    Dietary Consult: A dietary consult may be beneficial for a comprehensive nutritional assessment and personalized advice.    CT Abdomen/Pelvis: If necessary, we will consider a CT scan of the abdomen and pelvis.    Gastroenterology Procedure: I will ask the Gastroenterology team to add an esophagogastroduodenoscopy (EGD) to your scheduled colonoscopy in October.         Relevant Orders    CBC and Auto Differential    Comprehensive Metabolic Panel

## 2024-09-25 NOTE — ASSESSMENT & PLAN NOTE
Seek Immediate Medical Attention: Please seek immediate medical attention if you experience new or worsening symptoms such as severe abdominal pain, significant changes in bowel habits, chest pain, shortness of breath, or hemoptysis.    Food Diary: I recommend maintaining a detailed food diary to help identify any patterns or issues with your food intake.    Smoking Cessation: We will provide support to help you with smoking cessation.    Meal Frequency: To manage early satiety, try to consume smaller, more frequent meals and snacks.    Nutritional Supplements: Consider incorporating nutritional supplements like Ensure or Boost to increase your caloric intake.    Dietary Consult: A dietary consult may be beneficial for a comprehensive nutritional assessment and personalized advice.    CT Abdomen/Pelvis: If necessary, we will consider a CT scan of the abdomen and pelvis.    Gastroenterology Procedure: I will ask the Gastroenterology team to add an esophagogastroduodenoscopy (EGD) to your scheduled colonoscopy in October.

## 2024-09-25 NOTE — Clinical Note
Yesica Upton, Is there any chance that you could potentially add an upper endoscopy when you do this patient's colonoscopy in October?  He is having unintentional weight loss, long history of smoking, greater than 60+ pack year, and his father  of complications from esophageal cancer.

## 2024-09-26 NOTE — RESULT ENCOUNTER NOTE
The blood work that we done you yesterday does not reveal any significant concern as your blood count is normal, kidney, liver function is working wonderfully

## 2024-09-30 DIAGNOSIS — I10 BENIGN ESSENTIAL HYPERTENSION: ICD-10-CM

## 2024-09-30 RX ORDER — OLMESARTAN MEDOXOMIL 40 MG/1
40 TABLET ORAL DAILY
Qty: 90 TABLET | Refills: 1 | Status: SHIPPED | OUTPATIENT
Start: 2024-09-30

## 2024-10-21 ENCOUNTER — APPOINTMENT (OUTPATIENT)
Dept: GASTROENTEROLOGY | Facility: EXTERNAL LOCATION | Age: 74
End: 2024-10-21
Payer: MEDICARE

## 2024-10-21 DIAGNOSIS — Z12.11 SPECIAL SCREENING FOR MALIGNANT NEOPLASMS, COLON: ICD-10-CM

## 2024-10-21 DIAGNOSIS — D12.3 BENIGN NEOPLASM OF TRANSVERSE COLON: ICD-10-CM

## 2024-10-21 DIAGNOSIS — K21.9 GASTROESOPHAGEAL REFLUX DISEASE WITHOUT ESOPHAGITIS: Primary | ICD-10-CM

## 2024-10-21 DIAGNOSIS — K20.90 GASTROESOPHAGITIS: ICD-10-CM

## 2024-10-21 DIAGNOSIS — Z80.0 FAMILY HISTORY OF ESOPHAGEAL CANCER: ICD-10-CM

## 2024-10-21 DIAGNOSIS — R63.4 WEIGHT LOSS: ICD-10-CM

## 2024-10-21 DIAGNOSIS — K63.5 MULTIPLE BENIGN POLYPS OF LARGE INTESTINE: ICD-10-CM

## 2024-10-21 DIAGNOSIS — Z86.0100 HX OF COLONIC POLYPS: ICD-10-CM

## 2024-10-21 DIAGNOSIS — D12.2 BENIGN NEOPLASM OF ASCENDING COLON: ICD-10-CM

## 2024-10-21 DIAGNOSIS — K29.70 GASTROESOPHAGITIS: ICD-10-CM

## 2024-10-21 DIAGNOSIS — D12.4 BENIGN NEOPLASM OF DESCENDING COLON: ICD-10-CM

## 2024-10-21 DIAGNOSIS — K22.89 ESOPHAGEAL HEMORRHAGE: ICD-10-CM

## 2024-10-21 PROCEDURE — 88305 TISSUE EXAM BY PATHOLOGIST: CPT

## 2024-10-21 PROCEDURE — 45380 COLONOSCOPY AND BIOPSY: CPT | Performed by: INTERNAL MEDICINE

## 2024-10-21 PROCEDURE — 88305 TISSUE EXAM BY PATHOLOGIST: CPT | Performed by: PATHOLOGY

## 2024-10-21 PROCEDURE — 1123F ACP DISCUSS/DSCN MKR DOCD: CPT | Performed by: INTERNAL MEDICINE

## 2024-10-21 PROCEDURE — 0753T DGTZ GLS MCRSCP SLD LEVEL IV: CPT

## 2024-10-21 PROCEDURE — 43239 EGD BIOPSY SINGLE/MULTIPLE: CPT | Performed by: INTERNAL MEDICINE

## 2024-10-21 PROCEDURE — 45385 COLONOSCOPY W/LESION REMOVAL: CPT | Performed by: INTERNAL MEDICINE

## 2024-10-21 RX ORDER — OMEPRAZOLE 40 MG/1
40 CAPSULE, DELAYED RELEASE ORAL
Qty: 90 CAPSULE | Refills: 3 | Status: SHIPPED | OUTPATIENT
Start: 2024-10-21 | End: 2025-10-21

## 2024-10-22 ENCOUNTER — LAB REQUISITION (OUTPATIENT)
Dept: LAB | Facility: HOSPITAL | Age: 74
End: 2024-10-22
Payer: MEDICARE

## 2024-10-29 LAB
LABORATORY COMMENT REPORT: NORMAL
PATH REPORT.FINAL DX SPEC: NORMAL
PATH REPORT.GROSS SPEC: NORMAL
PATH REPORT.RELEVANT HX SPEC: NORMAL
PATH REPORT.TOTAL CANCER: NORMAL

## 2024-11-11 ENCOUNTER — LAB (OUTPATIENT)
Dept: LAB | Facility: LAB | Age: 74
End: 2024-11-11
Payer: MEDICARE

## 2024-11-11 DIAGNOSIS — E78.2 MIXED HYPERLIPIDEMIA: ICD-10-CM

## 2024-11-11 DIAGNOSIS — Z12.5 PROSTATE CANCER SCREENING: ICD-10-CM

## 2024-11-11 DIAGNOSIS — R73.03 PREDIABETES: ICD-10-CM

## 2024-11-11 DIAGNOSIS — R73.03 PREDIABETES: Primary | ICD-10-CM

## 2024-11-11 LAB
CHOLEST SERPL-MCNC: 157 MG/DL (ref 0–199)
CHOLESTEROL/HDL RATIO: 3.4
HDLC SERPL-MCNC: 46.2 MG/DL
LDLC SERPL CALC-MCNC: 71 MG/DL
NON HDL CHOLESTEROL: 111 MG/DL (ref 0–149)
PSA SERPL-MCNC: 2.34 NG/ML
TRIGL SERPL-MCNC: 199 MG/DL (ref 0–149)
VLDL: 40 MG/DL (ref 0–40)

## 2024-11-11 PROCEDURE — 80061 LIPID PANEL: CPT

## 2024-11-11 PROCEDURE — 36415 COLL VENOUS BLD VENIPUNCTURE: CPT

## 2024-11-11 PROCEDURE — G0103 PSA SCREENING: HCPCS

## 2024-11-11 PROCEDURE — 83036 HEMOGLOBIN GLYCOSYLATED A1C: CPT

## 2024-11-12 LAB
EST. AVERAGE GLUCOSE BLD GHB EST-MCNC: 117 MG/DL
HBA1C MFR BLD: 5.7 %

## 2024-11-12 NOTE — RESULT ENCOUNTER NOTE
We will review blood work in the office at next visit:  Hemoglobin A1c is 5.7% which is a daily average of 117

## 2024-11-12 NOTE — RESULT ENCOUNTER NOTE
We will review in the office on November 14:  Cholesterol 157, 46, 71, 199 previously 145, 42, 67, 176  PSA 2.34 previous 2.20, 1.67  At the time of review hemoglobin A1c was still pending

## 2024-11-14 ENCOUNTER — APPOINTMENT (OUTPATIENT)
Dept: PRIMARY CARE | Facility: CLINIC | Age: 74
End: 2024-11-14
Payer: MEDICARE

## 2024-11-14 VITALS
SYSTOLIC BLOOD PRESSURE: 134 MMHG | DIASTOLIC BLOOD PRESSURE: 80 MMHG | HEIGHT: 71 IN | WEIGHT: 206 LBS | HEART RATE: 66 BPM | BODY MASS INDEX: 28.84 KG/M2

## 2024-11-14 DIAGNOSIS — I71.21 ANEURYSM OF ASCENDING AORTA WITHOUT RUPTURE (CMS-HCC): ICD-10-CM

## 2024-11-14 DIAGNOSIS — F17.210 CIGARETTE SMOKER: ICD-10-CM

## 2024-11-14 DIAGNOSIS — N40.1 BPH WITH OBSTRUCTION/LOWER URINARY TRACT SYMPTOMS: ICD-10-CM

## 2024-11-14 DIAGNOSIS — I10 BENIGN ESSENTIAL HYPERTENSION: ICD-10-CM

## 2024-11-14 DIAGNOSIS — K21.9 GASTROESOPHAGEAL REFLUX DISEASE WITHOUT ESOPHAGITIS: ICD-10-CM

## 2024-11-14 DIAGNOSIS — R42 VERTIGO: ICD-10-CM

## 2024-11-14 DIAGNOSIS — N13.8 BPH WITH OBSTRUCTION/LOWER URINARY TRACT SYMPTOMS: ICD-10-CM

## 2024-11-14 DIAGNOSIS — E79.0 HYPERURICEMIA: ICD-10-CM

## 2024-11-14 DIAGNOSIS — E78.2 MIXED HYPERLIPIDEMIA: Primary | ICD-10-CM

## 2024-11-14 DIAGNOSIS — Z23 NEED FOR PNEUMOCOCCAL 20-VALENT CONJUGATE VACCINATION: ICD-10-CM

## 2024-11-14 PROCEDURE — 4004F PT TOBACCO SCREEN RCVD TLK: CPT | Performed by: FAMILY MEDICINE

## 2024-11-14 PROCEDURE — 1159F MED LIST DOCD IN RCRD: CPT | Performed by: FAMILY MEDICINE

## 2024-11-14 PROCEDURE — 99215 OFFICE O/P EST HI 40 MIN: CPT | Performed by: FAMILY MEDICINE

## 2024-11-14 PROCEDURE — 1160F RVW MEDS BY RX/DR IN RCRD: CPT | Performed by: FAMILY MEDICINE

## 2024-11-14 PROCEDURE — 1123F ACP DISCUSS/DSCN MKR DOCD: CPT | Performed by: FAMILY MEDICINE

## 2024-11-14 PROCEDURE — 3075F SYST BP GE 130 - 139MM HG: CPT | Performed by: FAMILY MEDICINE

## 2024-11-14 PROCEDURE — G0009 ADMIN PNEUMOCOCCAL VACCINE: HCPCS | Performed by: FAMILY MEDICINE

## 2024-11-14 PROCEDURE — 90677 PCV20 VACCINE IM: CPT | Performed by: FAMILY MEDICINE

## 2024-11-14 PROCEDURE — 3079F DIAST BP 80-89 MM HG: CPT | Performed by: FAMILY MEDICINE

## 2024-11-14 RX ORDER — ALLOPURINOL 100 MG/1
100 TABLET ORAL DAILY
Qty: 90 TABLET | Refills: 1 | Status: SHIPPED | OUTPATIENT
Start: 2024-11-14 | End: 2025-05-13

## 2024-11-14 RX ORDER — OLMESARTAN MEDOXOMIL 40 MG/1
40 TABLET ORAL DAILY
Qty: 90 TABLET | Refills: 1 | Status: SHIPPED | OUTPATIENT
Start: 2024-11-14

## 2024-11-14 RX ORDER — ATORVASTATIN CALCIUM 40 MG/1
40 TABLET, FILM COATED ORAL DAILY
Qty: 90 TABLET | Refills: 1 | Status: SHIPPED | OUTPATIENT
Start: 2024-11-14 | End: 2025-05-13

## 2024-11-14 RX ORDER — OMEPRAZOLE 40 MG/1
40 CAPSULE, DELAYED RELEASE ORAL
Qty: 90 CAPSULE | Refills: 1 | Status: SHIPPED | OUTPATIENT
Start: 2024-11-14 | End: 2025-05-13

## 2024-11-14 NOTE — ASSESSMENT & PLAN NOTE
A 4 page hand out on the Epley maneuver was given.  Please call if your symptoms worsen or do not resolve after this treatment.

## 2024-11-14 NOTE — ASSESSMENT & PLAN NOTE
Stable, no changes to medication recommended.    I would like to have you monitor and record blood pressures at home   Blood pressure goal should be below 130/80, ideally 120/80

## 2024-11-14 NOTE — PROGRESS NOTES
Subjective   Patient ID: Leonides Izaguirre is a 73 y.o. male who presents for Hypertension, Hyperlipidemia, and Hyperuricemia.    Past Medical, Surgical, and Family History reviewed and updated in chart.    Reviewed all medications by prescribing practitioner or clinical pharmacist (such as prescriptions, OTCs, herbal therapies and supplements) and documented in the medical record.    HPI  1. Hypertension  Blood pressure today on intake was 134/80.  Currently taking olmesartan 40 mg daily and tolerating the medication well.  Requesting refill today.    Denies chest pain, shortness of breath, headache, dizziness, or leg edema.    2. Hyperlipidemia  Currently taking atorvastatin 40 mg daily.  Requesting refill today.    A lipid panel performed prior to this visit in 2024 showed cholesterol levels of 157, HDL 46.2, LDL 71, and total cholesterol 199.  Denies myalgias and tolerates the medication well. He is requesting a refill.    3. Hyperuricemia  Using allopurinol 100 mg daily.  Requesting refill today.   The last uric acid test in May 2024 showed a level of 6.2.  He has not had any gout flares and does not wish to change the medication dose.    4. BPH with LUTS  Stable, currently taking Cialis 5 mg daily.  Prostate cancer screening has been stable, with a recent PSA of 2.34, prior 2.20 and 1.67.   Denies urinary symptoms.      5. Dilatation of ascending aorta  A low-dose CT scan of the lung performed in November 2022 indicated that his ascending aorta is 4.1 cm and stable with prior exams.  The condition remained stable in a CT done in December 2023. Annual screening is recommended.  Denies shortness of breath or chest pain.  He is due for a repeated low-dose CT in December 2024.    6. Vertigo   Leonides presents with increased dizziness, which he reports worsens with positional changes or when lying flat. Currently, he has been sleeping in a recliner, which has helped alleviate his symptoms. He has a history of a prior  vertigo episode, during which he performed at-home therapy that successfully resolved the symptoms.  He is requesting a hand out for this at home therapy again.      7. GERD   Stable, currently taking omeprazole 40 mg daily.  States he only has symptoms with certain foods or large meals.  He is interested in taking omeprazole as needed.      Review of Systems  All pertinent positive symptoms are included in the history of present illness.    All other systems have been reviewed and are negative and noncontributory to this patient's current ailments.    Past Medical History:   Diagnosis Date    Aneurysm of ascending aorta without rupture (CMS-HCC) 01/02/2024    Atrial septal aneurysm 01/02/2024    Encounter for screening for cardiovascular disorders 06/11/2021    Screening for AAA (abdominal aortic aneurysm)    Essential (primary) hypertension 12/01/2022    Benign essential hypertension    Hyperlipidemia, unspecified 12/01/2022    Hyperlipidemia    Nicotine dependence, unspecified, uncomplicated 05/23/2022    Tobacco dependence     Past Surgical History:   Procedure Laterality Date    OTHER SURGICAL HISTORY  12/12/2013    Prior Surgical Procedure Not Done     Social History     Tobacco Use    Smoking status: Every Day     Types: Cigarettes    Smokeless tobacco: Never     Family History   Problem Relation Name Age of Onset    Esophageal cancer Mother      Colon cancer Mother      Esophageal cancer Maternal Grandmother       Immunization History   Administered Date(s) Administered    Flu vaccine, quadrivalent, high-dose, preservative free, age 65y+ (FLUZONE) 09/18/2020    Flu vaccine, trivalent, preservative free, HIGH-DOSE, age 65y+ (Fluzone) 11/16/2019, 09/19/2020, 10/12/2022, 09/19/2024    Influenza, Seasonal, Quadrivalent, Adjuvanted 10/19/2021    Influenza, injectable, quadrivalent 11/27/2019    Moderna COVID-19 vaccine, 12 years and older (50mcg/0.5mL)(Spikevax) 09/19/2024    Moderna SARS-CoV-2 Vaccination  "02/04/2021, 03/04/2021    Pfizer COVID-19 vaccine, 12 years and older, (30mcg/0.3mL) (Comirnaty) 01/04/2024    Pfizer COVID-19 vaccine, bivalent, age 12 years and older (30 mcg/0.3 mL) 11/15/2022    Pfizer Gray Cap SARS-CoV-2 05/20/2022    Pfizer Purple Cap SARS-CoV-2 11/16/2021, 10/12/2022    Pneumococcal conjugate vaccine, 13-valent (PREVNAR 13) 11/29/2017    Pneumococcal conjugate vaccine, 20-valent (PREVNAR 20) 11/14/2024    Pneumococcal polysaccharide vaccine, 23-valent, age 2 years and older (PNEUMOVAX 23) 05/21/2013, 02/12/2019    Tdap vaccine, age 7 year and older (BOOSTRIX, ADACEL) 11/29/2017    Zoster vaccine, recombinant, adult (SHINGRIX) 07/28/2019    Zoster, Unspecified 10/23/2019    Zoster, live 05/21/2013     Current Outpatient Medications   Medication Instructions    allopurinol (ZYLOPRIM) 100 mg, oral, Daily    atorvastatin (LIPITOR) 40 mg, oral, Daily    fluocinolone (DermOtic) 0.01 % ear drops 5 drops, Each Ear, 2 times daily    olmesartan (BENICAR) 40 mg, oral, Daily    omeprazole (PRILOSEC) 40 mg, oral, Daily before breakfast, Do not crush or chew.    tadalafil (CIALIS) 5 mg, oral, Daily     Allergies   Allergen Reactions    Amlodipine Swelling     Objective   Vitals:    11/14/24 1011   BP: 134/80   Pulse: 66   Weight: 93.4 kg (206 lb)   Height: 1.803 m (5' 11\")     Body mass index is 28.73 kg/m².    BP Readings from Last 3 Encounters:   11/14/24 134/80   09/25/24 132/76   08/15/24 142/88      Wt Readings from Last 3 Encounters:   11/14/24 93.4 kg (206 lb)   09/25/24 93 kg (205 lb)   08/15/24 94.8 kg (209 lb)      Lab on 11/11/2024   Component Date Value    Cholesterol 11/11/2024 157     HDL-Cholesterol 11/11/2024 46.2     Cholesterol/HDL Ratio 11/11/2024 3.4     LDL Calculated 11/11/2024 71     VLDL 11/11/2024 40     Triglycerides 11/11/2024 199 (H)     Non HDL Cholesterol 11/11/2024 111     Prostate Specific Antige* 11/11/2024 2.34     Hemoglobin A1C 11/11/2024 5.7 (H)     Estimated Average " Glucose 11/11/2024 117    Lab Requisition on 10/21/2024   Component Date Value    Case Report 10/21/2024                      Value:Surgical Pathology                                Case: [id number]                                  Authorizing Provider:  Alexsandra Alan MD        Collected:           10/21/2024 1328              Ordering Location:     [facility]       Received:            10/22/2024 1146                                     Center                                                                       Pathologist:           Javid Snyder MD                                                       Specimens:   A) - DUODENAL BULB  BIOPSY, DUODENUM,DUODENAL BULB,SECOND PART,COLD FORCEP,BIOPSY                   B) - STOMACH ANTRUM BIOPSY, STOMACH,BODY,ANTRUM,COLD FORCEP,BIOPSY                                  C) - ESOPHAGUS DISTAL BIOPSY, ESOPHAGUS,LOWER THIRD @ 39CM,COLD FORCEP,BIOPSY                       D) - COLON  - RANDOM POLYP, COLON,ASCENDING,TRANSVERSE,DESCENDING, POLYPS X10,COLD                  FORCEP,COLD SNARE,POLYPECTOMY                                                              FINAL DIAGNOSIS 10/21/2024                      Value:A.  Duodenum, bulb biopsy:  --Duodenal mucosa with preserved villous architecture  --No diagnostic features of celiac sprue are seen    B.  Stomach biopsy:  --Mild chronic gastritis  --Negative for intestinal metaplasia or dysplasia   --No H. pylori-like microorganisms are identified on routine H&E stained sections      C.  Esophagus, lower third, biopsy: D.  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  Descending, ascending and transverse colon polyps:  --Tubular adenoma, multiple fragments        10/21/2024                      Value:By the signature on this report, the individual or group listed as making the Final Interpretation/Diagnosis  "certifies that they have reviewed this case.       Clinical History 10/21/2024                      Value:Gastro-esophageal reflux disease, weight loss, family history of esophageal cancer  High risk colon cancer surveillance, personal history of multiple (3 or more) adenomas, family history of colon cancer in a first-degree relative, last colonoscopy 2022  A) rule out Celiac Sprue  B) rule out Helicobacter Pylori  C) rule out Baer's      Gross Description 10/21/2024                      Value:A: Received in formalin, labeled with the patient's name and hospital number and \"1, duodenum, duodenal bulb, second part, cold forcep\", are multiple fragments of tan, soft tissue aggregating to 1.5 x 0.2 x 0.2 cm. The specimen is submitted in toto in one cassette.  JEK/SBS  B: Received in formalin, labeled with the patient's name and hospital number and \"2, stomach, body, antrum, cold forcep\", are multiple fragments of tan, soft tissue aggregating to 1.3 x 0.2 x 0.2 cm. The specimen is submitted in toto in one cassette.  JEK/SBS  C: Received in formalin, labeled with the patient's name and hospital number and \"3, esophagus, lower third, cold forcep\", are multiple fragments of tan, soft tissue aggregating to 0.9 x 0.2 x 0.2 cm. The specimen is submitted in toto in one cassette.  JEK/SBS  D: Received in formalin, labeled with the patient's name and hospital number and \"4, colon, descending, ascending, transverse, polyp, cold snare, cold forcep\", are multiple fragments of tan, soft                           tissue aggregating to 2.5 x 0.4 x 0.3 cm. The specimen is submitted in toto in one cassette.  JEK/SBS       Physical Exam  CONSTITUTIONAL - well nourished, well developed, looks like stated age, in no acute distress, not ill-appearing, and not tired appearing  SKIN - normal skin color and pigmentation, no rash, lesions, or nodules visualized  CHEST - clear to auscultation, no wheezing, no crackles and no rales, good " effort  CARDIAC - regular rate and regular rhythm, no skipped beats, no murmur  ABDOMEN - no organomegaly, soft, nontender, nondistended, normal bowel sounds, no guarding/rebound/rigidity, negative McBurney sign and negative Romero sign  EXTREMITIES - no obvious or evident edema, no obvious or evident deformities  NEUROLOGICAL - normal gait, normal balance, normal motor, no ataxia; alert, oriented and no focal signs  PSYCHIATRIC - alert, pleasant and cordial, age-appropriate    Assessment/Plan   Problem List Items Addressed This Visit       Benign essential hypertension     Stable, no changes to medication recommended.    I would like to have you monitor and record blood pressures at home   Blood pressure goal should be below 130/80, ideally 120/80         Relevant Medications    olmesartan (BENIcar) 40 mg tablet    BPH with obstruction/lower urinary tract symptoms     Continue current dose of Cialis at 5 mg daily.  We will continue to monitor your PSA levels. If it continues to raise or you begin to experience any urinary symptoms, I would recommend seeing a urologist.           Hyperlipidemia - Primary     Stable, no changes to medication recommended.          Relevant Medications    atorvastatin (Lipitor) 40 mg tablet    Hyperuricemia     Stable, no changes to medication recommended  Continue monitoring signs/symptoms         Relevant Medications    allopurinol (Zyloprim) 100 mg tablet    Aneurysm of ascending aorta without rupture (CMS-HCC)     CT chest due in December 2024.  Order placed.  Please schedule at your earliest convenience.    We will assess after CT scan   Continue follow up with cardiology          Cigarette smoker     Repeat low-dose CT was recommended secondary to your long history of smoking    When embarking on your journey to quit smoking, here are some sarmiento points to keep in mind:     1. Health Benefits: Quitting smoking offers a wide range of health benefits, including reduced risks of heart  disease, stroke, lung cancer, and respiratory problems. It also enhances lung function and increases your overall life expectancy.  2. Immediate Effects: Within hours of quitting, your body begins to experience positive changes. Blood pressure and heart rate decrease, and the carbon monoxide levels in your blood return to normal.  3. Long-term Benefits: Over time, quitting smoking significantly lowers the risk of developing chronic diseases and enhances your quality of life. It also reduces the risk of secondhand smoke-related health issues in those around you.  4. Support Systems: Make use of support systems to help you quit successfully. This can include the support of friends, family, participation in support groups, or seeking professional assistance through counseling or smoking cessation programs.  5. Nicotine Replacement Therapy (NRT): Consider using NRT options like nicotine patches, gum, lozenges, or inhalers. These aids can help manage nicotine cravings and withdrawal symptoms.  6. Prescription Medications: Certain medications, such as bupropion (Wellbutrin) or varenicline, may be prescribed to assist with smoking cessation.  7. Coping Strategies: Develop healthy coping mechanisms to deal with triggers and cravings. This might involve finding alternative activities, practicing stress-reducing techniques, or seeking professional help when necessary.  8. Lifestyle Changes: Adopting a healthier lifestyle can support your journey to quit smoking. This includes incorporating regular exercise, maintaining a balanced diet, and employing stress management techniques.  9. Relapse Prevention: Understand that quitting smoking is a process, and setbacks can occur. Use any relapses as opportunities to strengthen your determination to quit.  10. Follow-up Support: Regularly follow up with us to monitor your progress, address any concerns, and ensure ongoing support throughout your journey to quit smoking.     Remember,  quitting smoking is undoubtedly challenging, but the benefits it brings to your health and well-being make it a worthwhile endeavor. Your commitment to this goal is commendable, and we are here to support you every step of the way.         Relevant Orders    CT lung screening low dose    Gastroesophageal reflux disease without esophagitis     You can take omeprazole as needed for acid reflux symptoms.  Continue to watch what foods may worsen your symptoms and take the medication accordingly.           Relevant Medications    omeprazole (PriLOSEC) 40 mg DR capsule    Vertigo     A 4 page hand out on the Epley maneuver was given.  Please call if your symptoms worsen or do not resolve after this treatment.            Other Visit Diagnoses       Need for pneumococcal 20-valent conjugate vaccination        All questions were answered and you were counseled on immunization(s) in detail and vaccination was provided    Relevant Orders    Pneumococcal conjugate vaccine, 20-valent (PREVNAR 20) (Completed)

## 2024-11-14 NOTE — ASSESSMENT & PLAN NOTE
Continue current dose of Cialis at 5 mg daily.  We will continue to monitor your PSA levels. If it continues to raise or you begin to experience any urinary symptoms, I would recommend seeing a urologist.

## 2024-11-14 NOTE — ASSESSMENT & PLAN NOTE
You can take omeprazole as needed for acid reflux symptoms.  Continue to watch what foods may worsen your symptoms and take the medication accordingly.

## 2024-11-14 NOTE — ASSESSMENT & PLAN NOTE
Repeat low-dose CT was recommended secondary to your long history of smoking    When embarking on your journey to quit smoking, here are some sarmiento points to keep in mind:     1. Health Benefits: Quitting smoking offers a wide range of health benefits, including reduced risks of heart disease, stroke, lung cancer, and respiratory problems. It also enhances lung function and increases your overall life expectancy.  2. Immediate Effects: Within hours of quitting, your body begins to experience positive changes. Blood pressure and heart rate decrease, and the carbon monoxide levels in your blood return to normal.  3. Long-term Benefits: Over time, quitting smoking significantly lowers the risk of developing chronic diseases and enhances your quality of life. It also reduces the risk of secondhand smoke-related health issues in those around you.  4. Support Systems: Make use of support systems to help you quit successfully. This can include the support of friends, family, participation in support groups, or seeking professional assistance through counseling or smoking cessation programs.  5. Nicotine Replacement Therapy (NRT): Consider using NRT options like nicotine patches, gum, lozenges, or inhalers. These aids can help manage nicotine cravings and withdrawal symptoms.  6. Prescription Medications: Certain medications, such as bupropion (Wellbutrin) or varenicline, may be prescribed to assist with smoking cessation.  7. Coping Strategies: Develop healthy coping mechanisms to deal with triggers and cravings. This might involve finding alternative activities, practicing stress-reducing techniques, or seeking professional help when necessary.  8. Lifestyle Changes: Adopting a healthier lifestyle can support your journey to quit smoking. This includes incorporating regular exercise, maintaining a balanced diet, and employing stress management techniques.  9. Relapse Prevention: Understand that quitting smoking is a  process, and setbacks can occur. Use any relapses as opportunities to strengthen your determination to quit.  10. Follow-up Support: Regularly follow up with us to monitor your progress, address any concerns, and ensure ongoing support throughout your journey to quit smoking.     Remember, quitting smoking is undoubtedly challenging, but the benefits it brings to your health and well-being make it a worthwhile endeavor. Your commitment to this goal is commendable, and we are here to support you every step of the way.

## 2024-11-14 NOTE — ASSESSMENT & PLAN NOTE
CT chest due in December 2024.  Order placed.  Please schedule at your earliest convenience.    We will assess after CT scan   Continue follow up with cardiology

## 2024-12-06 ENCOUNTER — HOSPITAL ENCOUNTER (OUTPATIENT)
Dept: RADIOLOGY | Facility: CLINIC | Age: 74
Discharge: HOME | End: 2024-12-06
Payer: MEDICARE

## 2024-12-06 DIAGNOSIS — F17.210 CIGARETTE SMOKER: ICD-10-CM

## 2024-12-06 PROCEDURE — 71271 CT THORAX LUNG CANCER SCR C-: CPT

## 2024-12-09 DIAGNOSIS — J43.1 PANLOBULAR EMPHYSEMA (MULTI): Primary | ICD-10-CM

## 2024-12-09 DIAGNOSIS — R91.8 PULMONARY NODULES: ICD-10-CM

## 2025-01-02 ENCOUNTER — OFFICE VISIT (OUTPATIENT)
Dept: CARDIOLOGY | Facility: CLINIC | Age: 75
End: 2025-01-02
Payer: MEDICARE

## 2025-01-02 ENCOUNTER — HOSPITAL ENCOUNTER (OUTPATIENT)
Dept: CARDIOLOGY | Facility: CLINIC | Age: 75
Discharge: HOME | End: 2025-01-02
Payer: MEDICARE

## 2025-01-02 VITALS
DIASTOLIC BLOOD PRESSURE: 76 MMHG | HEIGHT: 71 IN | HEART RATE: 92 BPM | TEMPERATURE: 98.1 F | WEIGHT: 205 LBS | BODY MASS INDEX: 28.7 KG/M2 | SYSTOLIC BLOOD PRESSURE: 151 MMHG

## 2025-01-02 DIAGNOSIS — I71.21 ANEURYSM OF ASCENDING AORTA WITHOUT RUPTURE (CMS-HCC): ICD-10-CM

## 2025-01-02 DIAGNOSIS — I25.3 ATRIAL SEPTAL ANEURYSM: ICD-10-CM

## 2025-01-02 DIAGNOSIS — I25.10 CORONARY ARTERY CALCIFICATION SEEN ON CT SCAN: ICD-10-CM

## 2025-01-02 DIAGNOSIS — I10 BENIGN ESSENTIAL HYPERTENSION: ICD-10-CM

## 2025-01-02 PROCEDURE — 99214 OFFICE O/P EST MOD 30 MIN: CPT | Performed by: INTERNAL MEDICINE

## 2025-01-02 PROCEDURE — 3077F SYST BP >= 140 MM HG: CPT | Performed by: INTERNAL MEDICINE

## 2025-01-02 PROCEDURE — 1159F MED LIST DOCD IN RCRD: CPT | Performed by: INTERNAL MEDICINE

## 2025-01-02 PROCEDURE — 1123F ACP DISCUSS/DSCN MKR DOCD: CPT | Performed by: INTERNAL MEDICINE

## 2025-01-02 PROCEDURE — 1160F RVW MEDS BY RX/DR IN RCRD: CPT | Performed by: INTERNAL MEDICINE

## 2025-01-02 PROCEDURE — 3078F DIAST BP <80 MM HG: CPT | Performed by: INTERNAL MEDICINE

## 2025-01-02 PROCEDURE — 93306 TTE W/DOPPLER COMPLETE: CPT

## 2025-01-02 PROCEDURE — 93306 TTE W/DOPPLER COMPLETE: CPT | Performed by: INTERNAL MEDICINE

## 2025-01-02 RX ORDER — ASPIRIN 81 MG/1
81 TABLET ORAL DAILY
COMMUNITY

## 2025-01-02 NOTE — PROGRESS NOTES
Chief Complaint:   TAA     History of Present Illness     Leonides Izaguirre is a 74 y.o. male presenting with for follow-up of asymptomatic 4.1 cm ascending aortic aneurysm ans severe coronary artery calcification by CT 12/6/24.  The patient is tolerating their anti-hypertensive medications and is compliant.  The patient has been well since their last office appointment and is not having any chest pain, back pain, or dyspnea on exertion. The patient has been compliant with annual screening exams by echocardiography, chest CTA or MRA.  They do not have  bicuspid aortic valve, connective tissue disease, or family history of aortic aneurysm/dissection.  1 PPD smoker.    Review of Systems  All pertinent systems have been reviewed and are negative except for what is stated in the history of present illness.    All other systems have been reviewed and are negative and noncontributory to this patient's current ailments.  .       Previous History     Past Medical History:  He has a past medical history of Aneurysm of ascending aorta without rupture (CMS-HCC) (01/02/2024), Atrial septal aneurysm (01/02/2024), Encounter for screening for cardiovascular disorders (06/11/2021), Essential (primary) hypertension (12/01/2022), Hyperlipidemia, unspecified (12/01/2022), and Nicotine dependence, unspecified, uncomplicated (05/23/2022).    Past Surgical History:  He has a past surgical history that includes Other surgical history (12/12/2013).      Social History:  He reports that he has been smoking cigarettes. He has never used smokeless tobacco. No history on file for alcohol use and drug use.    Family History:  Family History   Problem Relation Name Age of Onset    Esophageal cancer Mother      Colon cancer Mother      Esophageal cancer Maternal Grandmother          Allergies:  Amlodipine    Outpatient Medications:  Current Outpatient Medications   Medication Instructions    allopurinol (ZYLOPRIM) 100 mg, oral, Daily     "atorvastatin (LIPITOR) 40 mg, oral, Daily    fluocinolone (DermOtic) 0.01 % ear drops 5 drops, Each Ear, 2 times daily    olmesartan (BENICAR) 40 mg, oral, Daily    omeprazole (PRILOSEC) 40 mg, oral, Daily before breakfast, Do not crush or chew.    tadalafil (CIALIS) 5 mg, oral, Daily       Physical Examination   Vitals:  Visit Vitals  /76 (BP Location: Right arm)   Pulse 92   Temp 36.7 °C (98.1 °F)   Ht 1.803 m (5' 11\")   Wt 93 kg (205 lb)   BMI 28.59 kg/m²   Smoking Status Every Day   BSA 2.16 m²    Physical Exam  Vitals reviewed.   Constitutional:       General: He is not in acute distress.     Appearance: Normal appearance.   HENT:      Head: Normocephalic and atraumatic.      Nose: Nose normal.   Eyes:      Conjunctiva/sclera: Conjunctivae normal.   Cardiovascular:      Rate and Rhythm: Normal rate and regular rhythm.      Pulses: Normal pulses.      Heart sounds: No murmur heard.  Pulmonary:      Effort: Pulmonary effort is normal. No respiratory distress.      Breath sounds: Normal breath sounds. No wheezing, rhonchi or rales.   Abdominal:      General: Bowel sounds are normal. There is no distension.      Palpations: Abdomen is soft.      Tenderness: There is no abdominal tenderness.   Musculoskeletal:         General: No swelling.      Right lower leg: No edema.      Left lower leg: No edema.   Skin:     General: Skin is warm and dry.      Capillary Refill: Capillary refill takes less than 2 seconds.   Neurological:      General: No focal deficit present.      Mental Status: He is alert.   Psychiatric:         Mood and Affect: Mood normal.           Labs/Imaging/Cardiac Studies   I have personally reviewed the patient's available lab work, primary care appointment notes, pertinent imaging studies, and cardiac studies and have discussed them and my independent interpretation of those results with the patient and caregiver at this appointment.  All pertinent recent Emergency Department evaluations and " Hospital admissions were also reviewed in detail with the patient and caregiver.    Reviewed Chest CT, Echo and Labs    Echo:  No echocardiogram results found for the past 12 months       Assessment and Recommendations     Assessment/Plan       1. Coronary artery calcification seen on CT scan  The patient's CAD, as detailed in the HPI, has been clinically stable, without any anginal symptoms or dyspnea.  The patient will continue treatment with guideline-directed medical therapy with statin medications and was advised regular exercise and a heart healthy diet.  Add ASA 81 every day.  Does not wish to do a stress test. Smoking cessation.    - Follow Up In Cardiology    2. Aneurysm of ascending aorta without rupture (CMS-HCC)  Stable and asymptomatic ascending aortic aneurysm with unchanged size of the aneurysm by recent imaging.  There is no indication for surgical repair. The patient is tolerating their anti-hypertensive medications including beta blocker and/or ACE/ARB when appropriate to limit expansion and is achieving a goal blood pressure of less than 130/80.  The patient will be schedule for annual surveillance imaging.    - Follow Up In Cardiology    3. Benign essential hypertension  The patient's blood pressure has been well-controlled at today's appointment or by recent primary care provider's measurements/home measurements and meets their goal blood pressure per the ACC/AHA guidelines.  The patient has been compliant with their anti-hypertensive medications and is following a low sodium/DASH diet. I advised continuation of their present medical treatment and lifestyle modification.     4. Hyperlipidemia   The patient's lipids are well controlled on chronic atorvastatin therapy and they are meeting their goal LDL cholesterol per the ACC/AHA guidelines.        - Follow Up In Cardiology    Leonides Izaguirre will return in 1 year for an office visit and Echo.            Alhaji James MD    Exclusive of any other  services or procedures performed, I, Alhaji James MD , spent 30 minutes in duration for this visit today.  This time consisted of chart review, obtaining history, and/or performing the exam as documented above as well as documenting the clinical information for the encounter in the electronic record, discussing treatment options, plans, and/or goals with patient, family, and/or caregiver, refilling medications, updating the electronic record, ordering medicines, lab work, imaging, referrals, and/or procedures as documented above and communicating with other [facility] professionals. I have discussed the results of laboratory, radiology, and cardiology studies with the patient and their family/caregiver.

## 2025-01-03 LAB
AORTIC VALVE MEAN GRADIENT: 11 MMHG
AORTIC VALVE PEAK VELOCITY: 2.2 M/S
AV PEAK GRADIENT: 19 MMHG
AVA (PEAK VEL): 1.83 CM2
AVA (VTI): 1.83 CM2
EJECTION FRACTION APICAL 4 CHAMBER: 59.9
EJECTION FRACTION: 64 %
LEFT ATRIUM VOLUME AREA LENGTH INDEX BSA: 28.5 ML/M2
LEFT VENTRICLE INTERNAL DIMENSION DIASTOLE: 3.92 CM (ref 3.5–6)
LEFT VENTRICULAR OUTFLOW TRACT DIAMETER: 1.89 CM
MITRAL VALVE E/A RATIO: 1.05
RIGHT VENTRICLE FREE WALL PEAK S': 15 CM/S
RIGHT VENTRICLE PEAK SYSTOLIC PRESSURE: 26.3 MMHG
TRICUSPID ANNULAR PLANE SYSTOLIC EXCURSION: 2.5 CM

## 2025-04-07 DIAGNOSIS — R97.20 ELEVATED PSA: ICD-10-CM

## 2025-04-07 DIAGNOSIS — I10 BENIGN ESSENTIAL HYPERTENSION: ICD-10-CM

## 2025-04-07 DIAGNOSIS — E78.2 MIXED HYPERLIPIDEMIA: ICD-10-CM

## 2025-04-07 DIAGNOSIS — E79.0 HYPERURICEMIA: ICD-10-CM

## 2025-04-12 LAB
ALBUMIN SERPL-MCNC: 4.5 G/DL (ref 3.6–5.1)
ALP SERPL-CCNC: 85 U/L (ref 35–144)
ALT SERPL-CCNC: 14 U/L (ref 9–46)
ANION GAP SERPL CALCULATED.4IONS-SCNC: 10 MMOL/L (CALC) (ref 7–17)
AST SERPL-CCNC: 16 U/L (ref 10–35)
BILIRUB SERPL-MCNC: 0.7 MG/DL (ref 0.2–1.2)
BUN SERPL-MCNC: 17 MG/DL (ref 7–25)
CALCIUM SERPL-MCNC: 9.3 MG/DL (ref 8.6–10.3)
CHLORIDE SERPL-SCNC: 102 MMOL/L (ref 98–110)
CHOLEST SERPL-MCNC: 166 MG/DL
CHOLEST/HDLC SERPL: 3.3 (CALC)
CO2 SERPL-SCNC: 29 MMOL/L (ref 20–32)
CREAT SERPL-MCNC: 1.11 MG/DL (ref 0.7–1.28)
EGFRCR SERPLBLD CKD-EPI 2021: 69 ML/MIN/1.73M2
GLUCOSE SERPL-MCNC: 94 MG/DL (ref 65–99)
HDLC SERPL-MCNC: 51 MG/DL
LDLC SERPL CALC-MCNC: 92 MG/DL (CALC)
NONHDLC SERPL-MCNC: 115 MG/DL (CALC)
POTASSIUM SERPL-SCNC: 4.5 MMOL/L (ref 3.5–5.3)
PROT SERPL-MCNC: 7.2 G/DL (ref 6.1–8.1)
SODIUM SERPL-SCNC: 141 MMOL/L (ref 135–146)
TRIGL SERPL-MCNC: 136 MG/DL
URATE SERPL-MCNC: 6.7 MG/DL (ref 4–8)

## 2025-04-12 NOTE — RESULT ENCOUNTER NOTE
We will review in the office on April 17:  Uric acid 6.7 with a goal of less than 6.0 so we may want to consider increasing allopurinol to 300 mg daily  Sugar, kidney, liver, electrolytes normal  Cholesterol looks excellent at 166 with LDL 92

## 2025-04-17 ENCOUNTER — APPOINTMENT (OUTPATIENT)
Dept: PRIMARY CARE | Facility: CLINIC | Age: 75
End: 2025-04-17
Payer: MEDICARE

## 2025-04-17 VITALS
WEIGHT: 205 LBS | DIASTOLIC BLOOD PRESSURE: 80 MMHG | SYSTOLIC BLOOD PRESSURE: 128 MMHG | HEART RATE: 68 BPM | HEIGHT: 71 IN | BODY MASS INDEX: 28.7 KG/M2

## 2025-04-17 DIAGNOSIS — E78.2 MIXED HYPERLIPIDEMIA: ICD-10-CM

## 2025-04-17 DIAGNOSIS — N13.8 BPH WITH OBSTRUCTION/LOWER URINARY TRACT SYMPTOMS: ICD-10-CM

## 2025-04-17 DIAGNOSIS — N40.1 BPH WITH OBSTRUCTION/LOWER URINARY TRACT SYMPTOMS: ICD-10-CM

## 2025-04-17 DIAGNOSIS — E79.0 HYPERURICEMIA: ICD-10-CM

## 2025-04-17 DIAGNOSIS — K21.9 GASTROESOPHAGEAL REFLUX DISEASE WITHOUT ESOPHAGITIS: ICD-10-CM

## 2025-04-17 DIAGNOSIS — F17.210 CIGARETTE SMOKER: Primary | ICD-10-CM

## 2025-04-17 DIAGNOSIS — I10 BENIGN ESSENTIAL HYPERTENSION: ICD-10-CM

## 2025-04-17 PROCEDURE — 4004F PT TOBACCO SCREEN RCVD TLK: CPT | Performed by: FAMILY MEDICINE

## 2025-04-17 PROCEDURE — 99214 OFFICE O/P EST MOD 30 MIN: CPT | Performed by: FAMILY MEDICINE

## 2025-04-17 PROCEDURE — 3074F SYST BP LT 130 MM HG: CPT | Performed by: FAMILY MEDICINE

## 2025-04-17 PROCEDURE — 1160F RVW MEDS BY RX/DR IN RCRD: CPT | Performed by: FAMILY MEDICINE

## 2025-04-17 PROCEDURE — 1123F ACP DISCUSS/DSCN MKR DOCD: CPT | Performed by: FAMILY MEDICINE

## 2025-04-17 PROCEDURE — 1159F MED LIST DOCD IN RCRD: CPT | Performed by: FAMILY MEDICINE

## 2025-04-17 PROCEDURE — G2211 COMPLEX E/M VISIT ADD ON: HCPCS | Performed by: FAMILY MEDICINE

## 2025-04-17 PROCEDURE — 3079F DIAST BP 80-89 MM HG: CPT | Performed by: FAMILY MEDICINE

## 2025-04-17 RX ORDER — OMEPRAZOLE 40 MG/1
40 CAPSULE, DELAYED RELEASE ORAL
Qty: 90 CAPSULE | Refills: 1 | Status: SHIPPED | OUTPATIENT
Start: 2025-04-17 | End: 2025-10-14

## 2025-04-17 RX ORDER — ATORVASTATIN CALCIUM 40 MG/1
40 TABLET, FILM COATED ORAL DAILY
Qty: 90 TABLET | Refills: 1 | Status: SHIPPED | OUTPATIENT
Start: 2025-04-17 | End: 2025-10-14

## 2025-04-17 RX ORDER — TADALAFIL 5 MG/1
5 TABLET ORAL DAILY
Qty: 90 TABLET | Refills: 1 | Status: SHIPPED | OUTPATIENT
Start: 2025-04-17 | End: 2025-10-14

## 2025-04-17 RX ORDER — ALLOPURINOL 100 MG/1
200 TABLET ORAL DAILY
Qty: 180 TABLET | Refills: 1 | Status: SHIPPED | OUTPATIENT
Start: 2025-04-17 | End: 2025-04-17

## 2025-04-17 RX ORDER — ALLOPURINOL 300 MG/1
300 TABLET ORAL DAILY
Qty: 90 TABLET | Refills: 1 | Status: SHIPPED | OUTPATIENT
Start: 2025-04-17 | End: 2025-10-14

## 2025-04-17 RX ORDER — OLMESARTAN MEDOXOMIL 40 MG/1
40 TABLET ORAL DAILY
Qty: 90 TABLET | Refills: 1 | Status: SHIPPED | OUTPATIENT
Start: 2025-04-17

## 2025-04-17 NOTE — PROGRESS NOTES
Chief Complaint  Patient ID: Leonides Izaguirre is a 74 y.o. male who presents for Hypertension, Hyperlipidemia, Benign Prostatic Hypertrophy, and GERD.    Past Medical, Surgical, and Family History reviewed and updated in chart.    Reviewed all medications by prescribing practitioner or clinical pharmacist (such as prescriptions, OTCs, herbal therapies and supplements) and documented in the medical record.    History of Present Illness  1. Hypertension     - Blood pressure on intake was 140/82, with a repeat measurement of 128/80.     - Currently taking olmesartan 40 mg daily; tolerating well and requesting a refill.     - Denies chest pain, shortness of breath, headache, dizziness, or leg edema.    2. Hyperlipidemia     - Currently taking atorvastatin 40 mg daily; requesting a refill.     - Lipid panel on 4/11/2025: cholesterol 166, HDL 51, LDL 92, triglycerides 136.     - Denies myalgias; tolerates medication well.    3. Hyperuricemia     - Using allopurinol 100 mg daily; requesting a refill.     - Last uric acid test on 4/11/2025 showed a level of 6.7.     - No gout flares; willing to increase allopurinol dose if needed.    4. BPH with LUTS     - Condition stable; taking Cialis 5 mg sparingly due to refill discrepancies.     - Prostate cancer screenings stable; recent PSA 2.34, prior 2.20 and 1.67.     - Denies urinary symptoms.    5. Dilatation of Ascending Aorta     - Low-dose CT in November 2022 showed ascending aorta at 4.1 cm, stable with prior exams.     - Condition stable in December 2024 CT.     - Denies shortness of breath or chest pain.    6. Cigarette Smoker     - Due for repeated low-dose CT in June 2025; interested in scheduling.     - Not interested in quitting smoking.    7. GERD     - Currently taking omeprazole 40 mg daily.     - Symptoms occur only with certain foods or large meals.     - Interested in taking omeprazole as needed.    Review of Systems  All pertinent positive symptoms are included  in the history of present illness.    All other systems have been reviewed and are negative and noncontributory to this patient's current ailments.    Past Medical History  He has a past medical history of Aneurysm of ascending aorta without rupture (01/02/2024), Atrial septal aneurysm (01/02/2024), Encounter for screening for cardiovascular disorders (06/11/2021), Essential (primary) hypertension (12/01/2022), Hyperlipidemia, unspecified (12/01/2022), and Nicotine dependence, unspecified, uncomplicated (05/23/2022).    Surgical History  He has a past surgical history that includes Other surgical history (12/12/2013).     Social History  He reports that he has been smoking cigarettes. He has never used smokeless tobacco. No history on file for alcohol use and drug use.    Family History[1]  Medications Prior to Visit[2]  Allergies  Amlodipine    Immunization History   Administered Date(s) Administered    COVID-19, mRNA, LNP-S, PF, 30 mcg/0.3 mL dose 11/16/2021    Flu vaccine, quadrivalent, high-dose, preservative free, age 65y+ (FLUZONE) 09/18/2020    Flu vaccine, trivalent, preservative free, HIGH-DOSE, age 65y+ (Fluzone) 11/16/2019, 09/19/2020, 10/12/2022, 09/19/2024    Influenza, Seasonal, Quadrivalent, Adjuvanted 10/19/2021    Influenza, injectable, quadrivalent 11/27/2019    Moderna COVID-19 vaccine, 12 years and older (50mcg/0.5mL)(Spikevax) 09/19/2024    Moderna SARS-CoV-2 Vaccination 02/04/2021, 03/04/2021    Pfizer COVID-19 vaccine, 12 years and older, (30mcg/0.3mL) (Comirnaty) 01/04/2024    Pfizer COVID-19 vaccine, bivalent, age 12 years and older (30 mcg/0.3 mL) 11/15/2022    Pfizer Gray Cap SARS-CoV-2 05/20/2022    Pfizer Purple Cap SARS-CoV-2 10/12/2022    Pneumococcal conjugate vaccine, 13-valent (PREVNAR 13) 11/29/2017    Pneumococcal conjugate vaccine, 20-valent (PREVNAR 20) 11/14/2024    Pneumococcal polysaccharide vaccine, 23-valent, age 2 years and older (PNEUMOVAX 23) 05/21/2013, 02/12/2019     "Tdap vaccine, age 7 year and older (BOOSTRIX, ADACEL) 11/29/2017    Zoster vaccine, recombinant, adult (SHINGRIX) 07/28/2019    Zoster, Unspecified 10/23/2019    Zoster, live 05/21/2013     Objective   Visit Vitals  /80   Pulse 68   Ht 1.803 m (5' 11\")   Wt 93 kg (205 lb)   BMI 28.59 kg/m²   Smoking Status Every Day   BSA 2.16 m²        BP Readings from Last 3 Encounters:   04/17/25 128/80   01/02/25 151/76   11/14/24 134/80      Wt Readings from Last 3 Encounters:   04/17/25 93 kg (205 lb)   01/02/25 93 kg (205 lb)   11/14/24 93.4 kg (206 lb)        Relevant Results  Orders Only on 04/07/2025   Component Date Value    URIC ACID 04/11/2025 6.7     CHOLESTEROL, TOTAL 04/11/2025 166     HDL CHOLESTEROL 04/11/2025 51     TRIGLYCERIDES 04/11/2025 136     LDL-CHOLESTEROL 04/11/2025 92     CHOL/HDLC RATIO 04/11/2025 3.3     NON HDL CHOLESTEROL 04/11/2025 115     GLUCOSE 04/11/2025 94     UREA NITROGEN (BUN) 04/11/2025 17     CREATININE 04/11/2025 1.11     EGFR 04/11/2025 69     SODIUM 04/11/2025 141     POTASSIUM 04/11/2025 4.5     CHLORIDE 04/11/2025 102     CARBON DIOXIDE 04/11/2025 29     ELECTROLYTE BALANCE 04/11/2025 10     CALCIUM 04/11/2025 9.3     PROTEIN, TOTAL 04/11/2025 7.2     ALBUMIN 04/11/2025 4.5     BILIRUBIN, TOTAL 04/11/2025 0.7     ALKALINE PHOSPHATASE 04/11/2025 85     AST 04/11/2025 16     ALT 04/11/2025 14      The 10-year ASCVD risk score (Marybeth UMANA, et al., 2019) is: 30%    Values used to calculate the score:      Age: 74 years      Sex: Male      Is Non- : No      Diabetic: No      Tobacco smoker: Yes      Systolic Blood Pressure: 128 mmHg      Is BP treated: Yes      HDL Cholesterol: 51 mg/dL      Total Cholesterol: 166 mg/dL    Physical Exam  CONSTITUTIONAL - No acute distress, age-appropriate  SKIN - normal skin color and pigmentation, normal skin turgor without rash, lesions, or nodules visualized  HEAD - no trauma, normocephalic  EYES - extraocular muscles are " intact, and normal external exam  NECK - no cervical lymphadenopathy  CHEST - clear to auscultation, no wheezing, no crackles and no rales, good effort  CARDIAC - regular rate and regular rhythm, no skipped beats, no murmur  ABDOMEN - no organomegaly, soft, nontender, nondistended, normal bowel sounds, no guarding/rebound/rigidity  EXTREMITIES - no obvious or evident edema, no obvious or evident deformities  NEUROLOGICAL - alert, oriented and no focal signs  PSYCHIATRIC - alert, pleasant and cordial, age-appropriate    Assessment and Plan  Problem List Items Addressed This Visit       Benign essential hypertension    Stable, no changes to medication recommended.  Refills sent to pharmacy.          Relevant Medications    olmesartan (BENIcar) 40 mg tablet    BPH with obstruction/lower urinary tract symptoms    Stable, continue current dose of Cialis at 5 mg daily. Refills sent to pharmacy.          Relevant Medications    tadalafil (Cialis) 5 mg tablet    Hyperlipidemia    Stable, no changes to medication recommended. Refill sent to pharmacy.          Relevant Medications    atorvastatin (Lipitor) 40 mg tablet    Hyperuricemia    Your recent uric acid level is 6.7, which is above the target goal of less than 6.0. To better manage this, I recommend increasing your allopurinol dose to 300 mg daily.     We will recheck your uric acid level at your next visit to monitor response to the dose adjustment.         Relevant Medications    allopurinol (Zyloprim) 300 mg tablet    Cigarette smoker - Primary    Your prior low-dose CT lung scan showed possible infectious or inflammatory changes. The recommendation was to repeat imaging in 6 months to assess for resolution. That follow-up scan has been ordered.    Due to the fact you have some pulmonary nodules on that CT scan in December, I would recommend getting an appointment with a pulmonologist which can be canceled if there is no significant changes to your upcoming CT  chest.         Gastroesophageal reflux disease without esophagitis    Stable, continue with current regimen. Refill sent to pharmacy.          Relevant Medications    omeprazole (PriLOSEC) 40 mg DR capsule          [1]   Family History  Problem Relation Name Age of Onset    Esophageal cancer Mother      Colon cancer Mother      Esophageal cancer Maternal Grandmother     [2]   Outpatient Medications Prior to Visit   Medication Sig Dispense Refill    aspirin 81 mg EC tablet Take 1 tablet (81 mg) by mouth once daily.      fluocinolone (DermOtic) 0.01 % ear drops Administer 5 drops into each ear 2 times a day. 20 mL 0    allopurinol (Zyloprim) 100 mg tablet Take 1 tablet (100 mg) by mouth once daily. 90 tablet 1    atorvastatin (Lipitor) 40 mg tablet Take 1 tablet (40 mg) by mouth once daily. 90 tablet 1    olmesartan (BENIcar) 40 mg tablet Take 1 tablet (40 mg) by mouth once daily. 90 tablet 1    omeprazole (PriLOSEC) 40 mg DR capsule Take 1 capsule (40 mg) by mouth once daily in the morning. Take before meals. Do not crush or chew. 90 capsule 1    tadalafil (Cialis) 5 mg tablet Take 1 tablet (5 mg) by mouth once daily. 90 tablet 1     No facility-administered medications prior to visit.

## 2025-04-17 NOTE — ASSESSMENT & PLAN NOTE
Your prior low-dose CT lung scan showed possible infectious or inflammatory changes. The recommendation was to repeat imaging in 6 months to assess for resolution. That follow-up scan has been ordered.    Due to the fact you have some pulmonary nodules on that CT scan in December, I would recommend getting an appointment with a pulmonologist which can be canceled if there is no significant changes to your upcoming CT chest.

## 2025-06-10 ENCOUNTER — HOSPITAL ENCOUNTER (OUTPATIENT)
Dept: RADIOLOGY | Facility: CLINIC | Age: 75
Discharge: HOME | End: 2025-06-10
Payer: MEDICARE

## 2025-06-10 DIAGNOSIS — J43.1 PANLOBULAR EMPHYSEMA (MULTI): ICD-10-CM

## 2025-06-10 DIAGNOSIS — R91.8 PULMONARY NODULES: ICD-10-CM

## 2025-06-10 PROCEDURE — 71250 CT THORAX DX C-: CPT

## 2025-06-10 PROCEDURE — 76380 CAT SCAN FOLLOW-UP STUDY: CPT | Performed by: RADIOLOGY

## 2025-06-12 NOTE — RESULT ENCOUNTER NOTE
Your recent CT scan of the chest, done as part of a smoker screening, shows that a previous area of lung collapse in the right middle lobe has improved. There is a stable, small, benign nodule in the lung, which is not a cause for concern. The scan also shows some thickening of the airways and emphysema, which are related to smoking. There are no new or growing nodules, and no signs of masses or blockages in the lungs or airways.    The heart appears normal in size, but there are calcifications in the coronary arteries and aortic valve, which can be very common findings, especially in those who have smoked as well as those who have known hyperlipidemia or elevated cholesterol.     The aortic aneurysm remains unchanged in size. There are no significant findings in the upper abdomen, and the spine shows some age-related changes.    Overall, the results are stable, and it is recommended to continue with annual low-dose CT scans to monitor your lung health.

## 2025-06-18 ASSESSMENT — ENCOUNTER SYMPTOMS
PARESTHESIAS: 1
ABDOMINAL PAIN: 0
TINGLING: 1
BACK PAIN: 1

## 2025-06-25 ENCOUNTER — APPOINTMENT (OUTPATIENT)
Dept: PRIMARY CARE | Facility: CLINIC | Age: 75
End: 2025-06-25
Payer: MEDICARE

## 2025-06-25 VITALS
HEIGHT: 71 IN | HEART RATE: 70 BPM | DIASTOLIC BLOOD PRESSURE: 80 MMHG | WEIGHT: 210 LBS | BODY MASS INDEX: 29.4 KG/M2 | SYSTOLIC BLOOD PRESSURE: 130 MMHG

## 2025-06-25 DIAGNOSIS — R60.0 BILATERAL LEG EDEMA: ICD-10-CM

## 2025-06-25 DIAGNOSIS — M54.50 CHRONIC BILATERAL LOW BACK PAIN, UNSPECIFIED WHETHER SCIATICA PRESENT: Primary | ICD-10-CM

## 2025-06-25 DIAGNOSIS — G89.29 CHRONIC BILATERAL LOW BACK PAIN, UNSPECIFIED WHETHER SCIATICA PRESENT: Primary | ICD-10-CM

## 2025-06-25 PROCEDURE — 99214 OFFICE O/P EST MOD 30 MIN: CPT | Performed by: FAMILY MEDICINE

## 2025-06-25 PROCEDURE — 1159F MED LIST DOCD IN RCRD: CPT | Performed by: FAMILY MEDICINE

## 2025-06-25 PROCEDURE — 3079F DIAST BP 80-89 MM HG: CPT | Performed by: FAMILY MEDICINE

## 2025-06-25 PROCEDURE — 3075F SYST BP GE 130 - 139MM HG: CPT | Performed by: FAMILY MEDICINE

## 2025-06-25 PROCEDURE — 4004F PT TOBACCO SCREEN RCVD TLK: CPT | Performed by: FAMILY MEDICINE

## 2025-06-25 RX ORDER — POTASSIUM CHLORIDE 20 MEQ/1
20 TABLET, EXTENDED RELEASE ORAL DAILY
Qty: 30 TABLET | Refills: 0 | Status: CANCELLED | OUTPATIENT
Start: 2025-06-25 | End: 2025-07-25

## 2025-06-25 RX ORDER — FUROSEMIDE 20 MG/1
20 TABLET ORAL DAILY PRN
Qty: 30 TABLET | Refills: 0 | Status: CANCELLED | OUTPATIENT
Start: 2025-06-25 | End: 2026-06-25

## 2025-06-25 RX ORDER — POTASSIUM CHLORIDE 750 MG/1
10 TABLET, FILM COATED, EXTENDED RELEASE ORAL DAILY
Qty: 30 TABLET | Refills: 0 | Status: SHIPPED | OUTPATIENT
Start: 2025-06-25 | End: 2025-07-25

## 2025-06-25 RX ORDER — METHYLPREDNISOLONE 4 MG/1
TABLET ORAL
Qty: 21 TABLET | Refills: 0 | Status: SHIPPED | OUTPATIENT
Start: 2025-06-25 | End: 2025-07-01

## 2025-06-25 RX ORDER — FUROSEMIDE 40 MG/1
40 TABLET ORAL DAILY PRN
Qty: 30 TABLET | Refills: 11 | Status: SHIPPED | OUTPATIENT
Start: 2025-06-25 | End: 2025-06-25

## 2025-06-25 RX ORDER — FUROSEMIDE 40 MG/1
40 TABLET ORAL DAILY PRN
Qty: 30 TABLET | Refills: 0 | Status: SHIPPED | OUTPATIENT
Start: 2025-06-25 | End: 2025-07-25

## 2025-06-25 NOTE — ASSESSMENT & PLAN NOTE
As discussed, a Medrol Dosepak has been sent to your preferred pharmacy. While we are unable to provide a refill in advance, we recommend contacting our office if your back pain flares up again, and we will send a prescription to your pharmacy.

## 2025-06-25 NOTE — PROGRESS NOTES
Chief Complaint  Patient ID: Leonides Izaguirre is a 74 y.o. male who presents for Back Pain.    Past Medical, Surgical, and Family History reviewed and updated in chart.    Reviewed all medications by prescribing practitioner or clinical pharmacist (such as prescriptions, OTCs, herbal therapies and supplements) and documented in the medical record.    Answers submitted by the patient for this visit:  Back Pain Questionnaire (Submitted on 6/18/2025)  Chief Complaint: Back pain  Chronicity: recurrent  Onset: more than 1 month ago  Frequency: daily  Progression since onset: waxing and waning  Pain location: sacro-iliac  Pain quality: aching, shooting  Radiates to: left knee  Pain - numeric: 6/10  Pain is: worse during the night  Aggravated by: bending, lying down  Stiffness is present: all day  abdominal pain: No  paresthesias: Yes  tingling: Yes    History of Present Illness  1. Low Back Pain     - History of low back pain with degenerative changes observed on X-ray a few years ago.     - Experiences periodic flare-ups; previously used a Medrol Dosepak for relief.     - No history of type 2 diabetes mellitus.     - Has not pursued physical therapy and prefers to defer it for now.     - Inquiring about a refill of the Medrol Dosepak for an upcoming trip to [place], in case of another flare-up.     - Wishes to postpone pain management referral at this time.    2. Bilateral Lower Extremity Swelling     - Experiencing bilateral ankle swelling for over two years, with some pain due to skin tightening.     - Reports no symptoms of claudication; is a current smoker.     - Consulted with cardiology; supposed to schedule a follow-up echocardiogram, which has not yet been done.     - Previous echocardiogram indicated a mild impaired left ventricular relaxation pattern.     - Denies experiencing orthopnea, dyspnea on exertion, chest pain, or palpitations.    Review of Systems  All pertinent positive symptoms are included in the  history of present illness.    All other systems have been reviewed and are negative and noncontributory to this patient's current ailments.    Past Medical History  He has a past medical history of Aneurysm of ascending aorta without rupture (01/02/2024), Arthritis (2020), Atrial septal aneurysm (01/02/2024), Emphysema of lung (Multi) (2020), Encounter for screening for cardiovascular disorders (06/11/2021), Essential (primary) hypertension (12/01/2022), GERD (gastroesophageal reflux disease) (2024), Hyperlipidemia, unspecified (12/01/2022), and Nicotine dependence, unspecified, uncomplicated (05/23/2022).    Surgical History  He has a past surgical history that includes Other surgical history (12/12/2013).     Social History  He reports that he has been smoking cigarettes. He has never used smokeless tobacco. He reports current alcohol use of about 4.0 standard drinks of alcohol per week. He reports that he does not use drugs.    Family History[1]  Medications Prior to Visit[2]    Allergies  Amlodipine    Immunization History   Administered Date(s) Administered    COVID-19, mRNA, LNP-S, PF, 30 mcg/0.3 mL dose 11/16/2021    Flu vaccine, quadrivalent, high-dose, preservative free, age 65y+ (FLUZONE) 09/18/2020    Flu vaccine, trivalent, preservative free, HIGH-DOSE, age 65y+ (Fluzone) 11/16/2019, 09/19/2020, 10/12/2022, 09/19/2024    Influenza, Seasonal, Quadrivalent, Adjuvanted 10/19/2021    Influenza, injectable, quadrivalent 11/27/2019    Moderna COVID-19 vaccine, 12 years and older (50mcg/0.5mL)(Spikevax) 09/19/2024    Moderna SARS-CoV-2 Vaccination 02/04/2021, 03/04/2021    Pfizer COVID-19 vaccine, 12 years and older, (30mcg/0.3mL) (Comirnaty) 01/04/2024    Pfizer COVID-19 vaccine, bivalent, age 12 years and older (30 mcg/0.3 mL) 11/15/2022    Pfizer Gray Cap SARS-CoV-2 05/20/2022    Pfizer Purple Cap SARS-CoV-2 10/12/2022    Pneumococcal conjugate vaccine, 13-valent (PREVNAR 13) 11/29/2017    Pneumococcal  "conjugate vaccine, 20-valent (PREVNAR 20) 11/14/2024    Pneumococcal polysaccharide vaccine, 23-valent, age 2 years and older (PNEUMOVAX 23) 05/21/2013, 02/12/2019    RSV, 60 Years And Older (AREXVY) 10/18/2024    Tdap vaccine, age 7 year and older (BOOSTRIX, ADACEL) 11/29/2017    Zoster vaccine, recombinant, adult (SHINGRIX) 07/28/2019    Zoster, Unspecified 10/23/2019    Zoster, live 05/21/2013     Objective   Visit Vitals  /80   Pulse 70   Ht 1.803 m (5' 11\")   Wt 95.3 kg (210 lb)   BMI 29.29 kg/m²   Smoking Status Every Day   BSA 2.18 m²     BSA: 2.18 meters squared     BP Readings from Last 3 Encounters:   06/25/25 130/80   04/17/25 128/80   01/02/25 151/76      Wt Readings from Last 3 Encounters:   06/25/25 95.3 kg (210 lb)   04/17/25 93 kg (205 lb)   01/02/25 93 kg (205 lb)     Relevant Results  No visits with results within 1 Month(s) from this visit.   Latest known visit with results is:   Orders Only on 04/07/2025   Component Date Value    URIC ACID 04/11/2025 6.7     CHOLESTEROL, TOTAL 04/11/2025 166     HDL CHOLESTEROL 04/11/2025 51     TRIGLYCERIDES 04/11/2025 136     LDL-CHOLESTEROL 04/11/2025 92     CHOL/HDLC RATIO 04/11/2025 3.3     NON HDL CHOLESTEROL 04/11/2025 115     GLUCOSE 04/11/2025 94     UREA NITROGEN (BUN) 04/11/2025 17     CREATININE 04/11/2025 1.11     EGFR 04/11/2025 69     SODIUM 04/11/2025 141     POTASSIUM 04/11/2025 4.5     CHLORIDE 04/11/2025 102     CARBON DIOXIDE 04/11/2025 29     ELECTROLYTE BALANCE 04/11/2025 10     CALCIUM 04/11/2025 9.3     PROTEIN, TOTAL 04/11/2025 7.2     ALBUMIN 04/11/2025 4.5     BILIRUBIN, TOTAL 04/11/2025 0.7     ALKALINE PHOSPHATASE 04/11/2025 85     AST 04/11/2025 16     ALT 04/11/2025 14      The 10-year ASCVD risk score (Marybeth UMANA, et al., 2019) is: 30.7%    Values used to calculate the score:      Age: 74 years      Sex: Male      Is Non- : No      Diabetic: No      Tobacco smoker: Yes      Systolic Blood Pressure: 130 " mmHg      Is BP treated: Yes      HDL Cholesterol: 51 mg/dL      Total Cholesterol: 166 mg/dL    Physical Exam  CONSTITUTIONAL - No acute distress, age-appropriate  SKIN - normal skin color and pigmentation, normal skin turgor without rash, lesions, or nodules visualized  HEAD - no trauma, normocephalic  EYES - extraocular muscles are intact, and normal external exam  CHEST - clear to auscultation, no wheezing, no crackles and no rales, good effort  CARDIAC - regular rate and regular rhythm, no skipped beats, no murmur  EXTREMITIES - B/L LE 2+ pitting edema. Negative Ludwin's. No erythema or warmth.   NEUROLOGICAL - alert, oriented and no focal signs  PSYCHIATRIC - alert, pleasant and cordial, age-appropriate    Assessment and Plan  Problem List Items Addressed This Visit       Low back pain, unspecified - Primary    As discussed, a Medrol Dosepak has been sent to your preferred pharmacy. While we are unable to provide a refill in advance, we recommend contacting our office if your back pain flares up again, and we will send a prescription to your pharmacy.         Relevant Medications    methylPREDNISolone (Medrol Dospak) 4 mg tablets    Bilateral leg edema    Your lower extremity swelling is likely due to dependent edema; however, we cannot rule out a cardiac cause without an updated echocardiogram. Dependent edema is swelling that occurs in the lower parts of the body, such as the legs, ankles, or feet, due to fluid buildup. It often happens when you sit or stand for long periods, as gravity causes fluid to accumulate in these areas.    Please schedule the echocardiogram at your earliest convenience. To help manage the swelling, I will prescribe Lasix 40 mg to be taken as needed, along with a potassium supplement, since Lasix can lower potassium levels. Additionally, I recommend using compression stockings and elevating your legs at the end of each day to alleviate symptoms.         Relevant Medications     potassium chloride CR (Klor-Con) 10 mEq ER tablet    furosemide (Lasix) 40 mg tablet          [1]   Family History  Problem Relation Name Age of Onset    Esophageal cancer Mother      Colon cancer Mother      Esophageal cancer Maternal Grandmother      Cancer Mother Linda    [2]   Outpatient Medications Prior to Visit   Medication Sig Dispense Refill    allopurinol (Zyloprim) 300 mg tablet Take 1 tablet (300 mg) by mouth once daily. 90 tablet 1    aspirin 81 mg EC tablet Take 1 tablet (81 mg) by mouth once daily.      atorvastatin (Lipitor) 40 mg tablet Take 1 tablet (40 mg) by mouth once daily. 90 tablet 1    fluocinolone (DermOtic) 0.01 % ear drops Administer 5 drops into each ear 2 times a day. 20 mL 0    olmesartan (BENIcar) 40 mg tablet Take 1 tablet (40 mg) by mouth once daily. 90 tablet 1    omeprazole (PriLOSEC) 40 mg DR capsule Take 1 capsule (40 mg) by mouth once daily in the morning. Take before meals. Do not crush or chew. 90 capsule 1    tadalafil (Cialis) 5 mg tablet Take 1 tablet (5 mg) by mouth once daily. 90 tablet 1     No facility-administered medications prior to visit.

## 2025-06-25 NOTE — ASSESSMENT & PLAN NOTE
Your lower extremity swelling is likely due to dependent edema; however, we cannot rule out a cardiac cause without an updated echocardiogram. Dependent edema is swelling that occurs in the lower parts of the body, such as the legs, ankles, or feet, due to fluid buildup. It often happens when you sit or stand for long periods, as gravity causes fluid to accumulate in these areas.    Please schedule the echocardiogram at your earliest convenience. To help manage the swelling, I will prescribe Lasix 40 mg to be taken as needed, along with a potassium supplement, since Lasix can lower potassium levels. Additionally, I recommend using compression stockings and elevating your legs at the end of each day to alleviate symptoms.

## 2025-06-30 ENCOUNTER — OFFICE VISIT (OUTPATIENT)
Dept: PULMONOLOGY | Facility: CLINIC | Age: 75
End: 2025-06-30
Payer: MEDICARE

## 2025-06-30 VITALS
BODY MASS INDEX: 29.4 KG/M2 | SYSTOLIC BLOOD PRESSURE: 129 MMHG | HEART RATE: 104 BPM | TEMPERATURE: 97.4 F | WEIGHT: 210 LBS | HEIGHT: 71 IN | DIASTOLIC BLOOD PRESSURE: 86 MMHG | OXYGEN SATURATION: 97 % | RESPIRATION RATE: 16 BRPM

## 2025-06-30 DIAGNOSIS — J30.0 VASOMOTOR RHINITIS: Primary | ICD-10-CM

## 2025-06-30 DIAGNOSIS — R91.1 LUNG NODULE: ICD-10-CM

## 2025-06-30 DIAGNOSIS — J43.9 PULMONARY EMPHYSEMA, UNSPECIFIED EMPHYSEMA TYPE (MULTI): ICD-10-CM

## 2025-06-30 DIAGNOSIS — F17.210 NICOTINE DEPENDENCE, CIGARETTES, UNCOMPLICATED: ICD-10-CM

## 2025-06-30 PROCEDURE — 99202 OFFICE O/P NEW SF 15 MIN: CPT

## 2025-06-30 PROCEDURE — 3074F SYST BP LT 130 MM HG: CPT | Performed by: STUDENT IN AN ORGANIZED HEALTH CARE EDUCATION/TRAINING PROGRAM

## 2025-06-30 PROCEDURE — 1159F MED LIST DOCD IN RCRD: CPT | Performed by: STUDENT IN AN ORGANIZED HEALTH CARE EDUCATION/TRAINING PROGRAM

## 2025-06-30 PROCEDURE — 99203 OFFICE O/P NEW LOW 30 MIN: CPT | Performed by: STUDENT IN AN ORGANIZED HEALTH CARE EDUCATION/TRAINING PROGRAM

## 2025-06-30 PROCEDURE — 3079F DIAST BP 80-89 MM HG: CPT | Performed by: STUDENT IN AN ORGANIZED HEALTH CARE EDUCATION/TRAINING PROGRAM

## 2025-06-30 PROCEDURE — 99212 OFFICE O/P EST SF 10 MIN: CPT

## 2025-06-30 RX ORDER — IPRATROPIUM BROMIDE 21 UG/1
2 SPRAY, METERED NASAL EVERY 12 HOURS
Qty: 30 ML | Refills: 12 | Status: SHIPPED | OUTPATIENT
Start: 2025-06-30 | End: 2026-06-30

## 2025-06-30 NOTE — PATIENT INSTRUCTIONS
Please use Atrovent (ipratropium) nasal spray nightly as needed. You may increase to twice daily if needed for your congestion  Please schedule your follow up CT scan for 1 year from now    Follow up in 1 year

## 2025-06-30 NOTE — PROGRESS NOTES
Subjective   Patient ID: Leonides Izaguirre is a 74 y.o. male who presents for Lung Nodule (Patient is here for initial visit. ).  HPI    Mr. Izaguirre is a 74yoM current smoker (63y, ~1ppd),     Morning cough, uses an OTC decongestant at night. Had not responded to nasal sprays. Since using the decongestant, the morning mucus has decreased.     PFT: none on file    LDCT 6/10/25: resolution of RML atelectasis, stable nodule R minor fissure, no concerning nodules. Unchanged mid ascending thoracic aortic aneurysm    Review of Systems    Objective   Physical Exam    Assessment/Plan   {Assess/PlanSmartLinks:46978}         Shila Reis DO 06/30/25 9:59 AM    Rhythm: Normal rate and regular rhythm.      Heart sounds: No murmur heard.     No friction rub. No gallop.   Pulmonary:      Effort: Pulmonary effort is normal.      Breath sounds: Normal breath sounds. No wheezing, rhonchi or rales.     Musculoskeletal:      Right lower leg: No edema.      Left lower leg: No edema.     Skin:     Findings: No lesion or rash.     Neurological:      Mental Status: He is alert and oriented to person, place, and time.     Psychiatric:         Mood and Affect: Mood normal.         Behavior: Behavior normal.         Thought Content: Thought content normal.         Judgment: Judgment normal.         Assessment/Plan     Mr. Izaguirre is a 74yoM current smoker (63y, ~1ppd) with history of HTN, GERD, seasonal allergic rhinitis, ascending aortic aneurysm, and a lung nodule presenting for evaluation of emphysema and his lung nodule.  Diagnoses and all orders for this visit:  Vasomotor rhinitis  -Suspect vasomotor rhinitis given lack of allergic symptoms, will trial atrovent nasal spray  -     ipratropium (Atrovent) 21 mcg (0.03 %) nasal spray; Administer 2 sprays into each nostril every 12 hours.  Pulmonary emphysema, unspecified emphysema type (Multi)  -Asymptomatic, can obtain PFTs if he becomes symptomatic  -     Follow Up In Pulmonology; Future  Lung nodule  Nicotine dependence  -Discussed smoking cessation, declines medications  -Likely benign, follow up CT in 1 year       Shila Reis DO 07/21/25 12:04 PM    38

## 2025-07-21 ASSESSMENT — ENCOUNTER SYMPTOMS
MYALGIAS: 0
WHEEZING: 0
SLEEP DISTURBANCE: 0
FEVER: 0
SHORTNESS OF BREATH: 0
CHILLS: 0
COUGH: 1

## 2025-08-19 ENCOUNTER — APPOINTMENT (OUTPATIENT)
Dept: PRIMARY CARE | Facility: CLINIC | Age: 75
End: 2025-08-19
Payer: MEDICARE